# Patient Record
Sex: MALE | Race: WHITE | Employment: FULL TIME | URBAN - METROPOLITAN AREA
[De-identification: names, ages, dates, MRNs, and addresses within clinical notes are randomized per-mention and may not be internally consistent; named-entity substitution may affect disease eponyms.]

---

## 2017-01-17 ENCOUNTER — TELEPHONE (OUTPATIENT)
Dept: INTERNAL MEDICINE CLINIC | Age: 62
End: 2017-01-17

## 2017-01-17 RX ORDER — METHYLPREDNISOLONE 4 MG/1
TABLET ORAL
Qty: 1 KIT | Refills: 0 | Status: SHIPPED | OUTPATIENT
Start: 2017-01-17 | End: 2017-01-23

## 2017-01-17 RX ORDER — PROMETHAZINE HYDROCHLORIDE AND CODEINE PHOSPHATE 6.25; 1 MG/5ML; MG/5ML
5 SYRUP ORAL EVERY 6 HOURS PRN
Qty: 120 ML | Refills: 1 | Status: SHIPPED | OUTPATIENT
Start: 2017-01-17 | End: 2019-03-27 | Stop reason: ALTCHOICE

## 2017-04-06 ENCOUNTER — TELEPHONE (OUTPATIENT)
Dept: INTERNAL MEDICINE CLINIC | Age: 62
End: 2017-04-06

## 2017-04-06 RX ORDER — DICYCLOMINE HYDROCHLORIDE 10 MG/1
10 CAPSULE ORAL EVERY 6 HOURS PRN
Qty: 30 CAPSULE | Refills: 0 | Status: SHIPPED | OUTPATIENT
Start: 2017-04-06 | End: 2019-03-27 | Stop reason: ALTCHOICE

## 2017-07-25 ENCOUNTER — TELEPHONE (OUTPATIENT)
Dept: INTERNAL MEDICINE CLINIC | Age: 62
End: 2017-07-25

## 2017-07-25 DIAGNOSIS — R31.9 HEMATURIA: Primary | ICD-10-CM

## 2017-11-16 RX ORDER — ROSUVASTATIN CALCIUM 20 MG/1
TABLET, COATED ORAL
Qty: 90 TABLET | Refills: 0 | Status: SHIPPED | OUTPATIENT
Start: 2017-11-16 | End: 2018-02-14 | Stop reason: SDUPTHER

## 2017-11-16 RX ORDER — VALSARTAN 80 MG/1
TABLET ORAL
Qty: 90 TABLET | Refills: 0 | Status: SHIPPED | OUTPATIENT
Start: 2017-11-16 | End: 2018-02-14 | Stop reason: SDUPTHER

## 2017-12-23 RX ORDER — AZITHROMYCIN 250 MG/1
TABLET, FILM COATED ORAL
Qty: 1 PACKET | Refills: 0 | Status: SHIPPED | OUTPATIENT
Start: 2017-12-23 | End: 2018-01-02

## 2018-02-14 RX ORDER — VALSARTAN 80 MG/1
TABLET ORAL
Qty: 90 TABLET | Refills: 0 | Status: SHIPPED | OUTPATIENT
Start: 2018-02-14 | End: 2018-06-19 | Stop reason: SDUPTHER

## 2018-02-15 RX ORDER — ROSUVASTATIN CALCIUM 20 MG/1
TABLET, COATED ORAL
Qty: 90 TABLET | Refills: 0 | Status: SHIPPED | OUTPATIENT
Start: 2018-02-15 | End: 2018-06-19 | Stop reason: SDUPTHER

## 2018-05-27 LAB
BASOPHILS ABSOLUTE: 0.04 /ΜL
BASOPHILS RELATIVE PERCENT: 0.6 %
BILIRUBIN, URINE: NEGATIVE
BLOOD, URINE: POSITIVE
BUN BLDV-MCNC: 8 MG/DL
CALCIUM SERPL-MCNC: 9.5 MG/DL
CHLORIDE BLD-SCNC: 104 MMOL/L
CLARITY: CLEAR
CO2: 26 MMOL/L
COLOR: ABNORMAL
CREAT SERPL-MCNC: 0.77 MG/DL
EOSINOPHILS ABSOLUTE: 0.14 /ΜL
EOSINOPHILS RELATIVE PERCENT: 2.2 %
GFR CALCULATED: 99
GLUCOSE BLD-MCNC: 125 MG/DL
GLUCOSE URINE: NEGATIVE
HCT VFR BLD CALC: 41.1 % (ref 41–53)
HEMOGLOBIN: 14.2 G/DL (ref 13.5–17.5)
KETONES, URINE: NEGATIVE
LEUKOCYTE ESTERASE, URINE: NEGATIVE
LYMPHOCYTES ABSOLUTE: 2.63 /ΜL
LYMPHOCYTES RELATIVE PERCENT: 41.4 %
MCH RBC QN AUTO: 31 PG
MCHC RBC AUTO-ENTMCNC: 34.5 G/DL
MCV RBC AUTO: 89.7 FL
MONOCYTES ABSOLUTE: 0.87 /ΜL
MONOCYTES RELATIVE PERCENT: 13.7 %
NEUTROPHILS ABSOLUTE: 2.65 /ΜL
NEUTROPHILS RELATIVE PERCENT: 41.6 %
NITRITE, URINE: NEGATIVE
PDW BLD-RTO: 12.1 %
PH UA: 7 (ref 4.5–8)
PLATELET # BLD: 171 K/ΜL
PMV BLD AUTO: 9.6 FL
POTASSIUM SERPL-SCNC: 4 MMOL/L
PROTEIN UA: POSITIVE
RBC # BLD: 4.58 10^6/ΜL
SODIUM BLD-SCNC: 142 MMOL/L
SPECIFIC GRAVITY, URINE: 1
UROBILINOGEN, URINE: NORMAL
WBC # BLD: 6.36 10^3/ML

## 2018-09-20 RX ORDER — ROSUVASTATIN CALCIUM 20 MG/1
TABLET, COATED ORAL
Qty: 90 TABLET | Refills: 3 | Status: SHIPPED | OUTPATIENT
Start: 2018-09-20 | End: 2019-10-06 | Stop reason: SDUPTHER

## 2019-03-27 ENCOUNTER — OFFICE VISIT (OUTPATIENT)
Dept: INTERNAL MEDICINE CLINIC | Age: 64
End: 2019-03-27
Payer: COMMERCIAL

## 2019-03-27 DIAGNOSIS — Z23 NEED FOR PROPHYLACTIC VACCINATION AND INOCULATION AGAINST VARICELLA: ICD-10-CM

## 2019-03-27 DIAGNOSIS — E55.9 VITAMIN D DEFICIENCY: ICD-10-CM

## 2019-03-27 DIAGNOSIS — Z00.00 WELLNESS EXAMINATION: Primary | ICD-10-CM

## 2019-03-27 DIAGNOSIS — R00.2 PALPITATIONS: ICD-10-CM

## 2019-03-27 PROCEDURE — 93000 ELECTROCARDIOGRAM COMPLETE: CPT | Performed by: INTERNAL MEDICINE

## 2019-03-27 PROCEDURE — 99396 PREV VISIT EST AGE 40-64: CPT | Performed by: INTERNAL MEDICINE

## 2019-03-27 ASSESSMENT — PATIENT HEALTH QUESTIONNAIRE - PHQ9
1. LITTLE INTEREST OR PLEASURE IN DOING THINGS: 0
SUM OF ALL RESPONSES TO PHQ QUESTIONS 1-9: 0
2. FEELING DOWN, DEPRESSED OR HOPELESS: 0
SUM OF ALL RESPONSES TO PHQ9 QUESTIONS 1 & 2: 0
SUM OF ALL RESPONSES TO PHQ QUESTIONS 1-9: 0

## 2019-04-07 VITALS
HEIGHT: 76 IN | HEART RATE: 72 BPM | DIASTOLIC BLOOD PRESSURE: 70 MMHG | SYSTOLIC BLOOD PRESSURE: 130 MMHG | WEIGHT: 210 LBS | BODY MASS INDEX: 25.57 KG/M2 | RESPIRATION RATE: 16 BRPM

## 2019-04-07 NOTE — PROGRESS NOTES
Annual  Exam      PI:        The patient is a 66year old white male who present for his wellness exam. He has a history of atrial fibrillation status post cardioversion then ablation therapy at [facility] on 3/8/2012. He has been doing well since then. Doing well and using his medications as directed and without side effects. No headache, chest pain, or breathing difficulties. Doing his normal physical activities without limitations or symptoms. There is a history of HTN, hyperlipidemia, and GERD presently on medication with good results. No headache, chest pain, or breathing difficulties. No swallowing difficulties. He had a normal abdominal aortic ultrasound by Dr Josefa Iglesias on 10/28/2008. He completed his colonoscopy on 11/19/2009 by Dr Kash Jaramillo. A few scattered diverticuli were seen otherwise normal to the cecum. A ten year follow up was recommended. No abdominal pain, change in bowel habits, hematochezia, or melanotic stools. He was using Nexium for GERD symptoms with no swallowing difficulties. He is no longer on medication. He has been diagnosed with bladder cancer and underwent urorobotic surgery at [facility] by Dr Tonya Herrera. He is now undergoing bladder infusion with BCG solution. PMH:    Problems of bladder cancer, HTN, hyperlipidemia, GERD, glaucoma, and atrial fibrillation. No history of ASHD, diabetes, or pulmonary disorder. Surgical history is none. MED:   Current Outpatient Medications   Medication Sig Dispense Refill    valsartan (DIOVAN) 80 MG tablet TAKE 1 TABLET BY MOUTH EVERY DAY 90 tablet 3    rosuvastatin (CRESTOR) 20 MG tablet TAKE 1 TABLET BY MOUTH EVERY DAY 90 tablet 3    fluticasone (FLONASE) 50 MCG/ACT nasal spray USE 1 SPRAY IN EACH NOSTRIL EVERY DAY 1 Bottle 1    latanoprost (XALATAN) 0.005 % ophthalmic solution Place 1 drop into both eyes.  Dr Lakhani Raymond  3    esomeprazole (NEXIUM) 20 MG capsule Take 20 mg by mouth every morning (before breakfast). OTC      EPINEPHrine (EPIPEN 2-BRUNA) 0.3 MG/0.3ML KIERAN injection Inject 0.3 mLs into the muscle once as needed for 1 dose. 1 Device 1     No current facility-administered medications for this visit. ALL: allergy to all nuts, intolerance to Zocor. SH:  Quit smoking cigarettes at age 34 with a 4 pack year history. Has 4 beers a week and two coffees a day. FH:  Mother  age 80 of CVA with history of ASHD. Father  age 62 of MI and was a heavy smoker. ROS: Head:       No headache, voice change, hoarseness, or swallowing difficulties            Resp:       No wheezing, coughing, or hemoptysis            CV:           No rest or exertional chest pain. No orthopnea or PND. GI:            No abdominal pain, change in bowel habits, hematochezia, or melanotic stool. :          No frequency, urgency, dysuria, or hematuria. Neuro:     No symptoms of cranial nerve dysfunction, gait difficulties, or extremity weakness. Immun:    Up to date on influenza and TDap. PE:    Vitals:      Blood pressure 130/70, pulse 72, resp. rate 16, height 6' 3.5\" (1.918 m), weight 210 lb (95.3 kg). GEN:       No acute distress, alert and oriented x3            HENNT:  TMs and auditory canals are clear. Pupils equal and reactive to light. EOMs intact. Fundi are clear and discs are flat. Oropharynx is clear. No facial rash or lesions. NECK:     Supple without palpable lymph nodes. Thyroid exam is normal.            LUNGS:   Clear to auscultation. CV:          Regular pulse. Normal S1 & S2. No murmur. No carotid bruits. Axilla:      No palpable nodes. ABD:       Bowel sounds normal. There is no distention. No abdominal bruits.                             No tenderness, guarding, rebound, masses, or organomegaly. NAHEED:      No hernia or palpable lymphadenopathy. RECT:    Deferred             EXT:       No edema. Distal pulses are normal.            SKIN:      No abnormal skin lesions were seen. NEURO: Cranial nerves II-XII grossly intact. Gait was normal. Moving all extremities well                          IMP: 1. Wellness exam              PLAN:  1.  Comp Chem, CBC, Lipid, TSH, UA, PSA, CPK, Vitamin D, and EKG              2. Hgba1c              3. Return in two weeks

## 2019-04-09 ENCOUNTER — OFFICE VISIT (OUTPATIENT)
Dept: INTERNAL MEDICINE CLINIC | Age: 64
End: 2019-04-09
Payer: COMMERCIAL

## 2019-04-09 VITALS — HEART RATE: 74 BPM | DIASTOLIC BLOOD PRESSURE: 70 MMHG | SYSTOLIC BLOOD PRESSURE: 136 MMHG | RESPIRATION RATE: 15 BRPM

## 2019-04-09 DIAGNOSIS — I10 ESSENTIAL HYPERTENSION: Primary | ICD-10-CM

## 2019-04-09 DIAGNOSIS — E55.9 VITAMIN D DEFICIENCY: ICD-10-CM

## 2019-04-09 DIAGNOSIS — E78.5 HYPERLIPIDEMIA, UNSPECIFIED HYPERLIPIDEMIA TYPE: ICD-10-CM

## 2019-04-09 DIAGNOSIS — I48.0 PAROXYSMAL ATRIAL FIBRILLATION (HCC): ICD-10-CM

## 2019-04-09 DIAGNOSIS — E78.2 MIXED HYPERLIPIDEMIA: ICD-10-CM

## 2019-04-09 DIAGNOSIS — E74.39 GLUCOSE INTOLERANCE: ICD-10-CM

## 2019-04-09 PROCEDURE — 99213 OFFICE O/P EST LOW 20 MIN: CPT | Performed by: INTERNAL MEDICINE

## 2019-04-19 PROBLEM — E55.9 VITAMIN D DEFICIENCY: Status: ACTIVE | Noted: 2019-04-19

## 2019-04-19 PROBLEM — E74.39 GLUCOSE INTOLERANCE: Status: ACTIVE | Noted: 2019-04-19

## 2019-04-19 PROBLEM — C67.4 MALIGNANT NEOPLASM OF POSTERIOR WALL OF URINARY BLADDER (HCC): Status: ACTIVE | Noted: 2018-08-06

## 2019-04-20 NOTE — PROGRESS NOTES
S: Patient presents with problems of atrial fib s/p ablation, HTN, hyperlipidemia, GERD, and bladder cancer. Presently doing well with no headache, chest pain, breathing difficulties. He is undergoing bladder infusion with BCG at [facility]. He is tolerating his Crestor and following his low fat low carb diet. O:Blood pressure 136/70, pulse 74, resp. rate 15.       Lungs: clear      Cardio: reg pulse      Ext: no edema        Labs: from 3/27/19 CBC & UA normal, LDL 90 HDL 45 Trig 107, Hgba1c 6.1%, Vit D 24, PSA 2.89, TSH 1.99, CK 79, Lytes normal, BUN 16 Creat 0.8, Glucose 106, Liver normal except ALT 53       EKG: NSR, incomplete RBBB    A: Bladder Cancer treated at [facility]      Hyperlipidemia on Crestor      Atrial fib s/p ablation therapy      Glucose intolerance      Vitamin D deficiency      ALT elevation      HTN controlled    P: copy of labs and EKG given      Continue present medications      Vitamin D3 at 2,000 units a day       OV in 6M with basic chem lipid liver cpk cbc       H&P one year

## 2019-10-22 DIAGNOSIS — I10 ESSENTIAL HYPERTENSION: ICD-10-CM

## 2019-10-22 DIAGNOSIS — I48.0 PAROXYSMAL ATRIAL FIBRILLATION (HCC): ICD-10-CM

## 2019-10-22 DIAGNOSIS — E78.5 HYPERLIPIDEMIA, UNSPECIFIED HYPERLIPIDEMIA TYPE: ICD-10-CM

## 2019-10-22 LAB
ALBUMIN SERPL-MCNC: 4.7 G/DL (ref 3.4–5)
ALP BLD-CCNC: 84 U/L (ref 40–129)
ALT SERPL-CCNC: 53 U/L (ref 10–40)
ANION GAP SERPL CALCULATED.3IONS-SCNC: 13 MMOL/L (ref 3–16)
AST SERPL-CCNC: 33 U/L (ref 15–37)
BASOPHILS ABSOLUTE: 0 K/UL (ref 0–0.2)
BASOPHILS RELATIVE PERCENT: 0.6 %
BILIRUB SERPL-MCNC: 0.3 MG/DL (ref 0–1)
BILIRUBIN DIRECT: <0.2 MG/DL (ref 0–0.3)
BILIRUBIN, INDIRECT: ABNORMAL MG/DL (ref 0–1)
BUN BLDV-MCNC: 15 MG/DL (ref 7–20)
CALCIUM SERPL-MCNC: 9.4 MG/DL (ref 8.3–10.6)
CHLORIDE BLD-SCNC: 105 MMOL/L (ref 99–110)
CHOLESTEROL, TOTAL: 148 MG/DL (ref 0–199)
CO2: 25 MMOL/L (ref 21–32)
CREAT SERPL-MCNC: 0.8 MG/DL (ref 0.8–1.3)
EOSINOPHILS ABSOLUTE: 0.2 K/UL (ref 0–0.6)
EOSINOPHILS RELATIVE PERCENT: 3.3 %
GFR AFRICAN AMERICAN: >60
GFR NON-AFRICAN AMERICAN: >60
GLUCOSE BLD-MCNC: 119 MG/DL (ref 70–99)
HCT VFR BLD CALC: 43.1 % (ref 40.5–52.5)
HDLC SERPL-MCNC: 45 MG/DL (ref 40–60)
HEMOGLOBIN: 14.5 G/DL (ref 13.5–17.5)
LDL CHOLESTEROL CALCULATED: 86 MG/DL
LYMPHOCYTES ABSOLUTE: 1.7 K/UL (ref 1–5.1)
LYMPHOCYTES RELATIVE PERCENT: 27.7 %
MCH RBC QN AUTO: 31.3 PG (ref 26–34)
MCHC RBC AUTO-ENTMCNC: 33.7 G/DL (ref 31–36)
MCV RBC AUTO: 92.6 FL (ref 80–100)
MONOCYTES ABSOLUTE: 0.7 K/UL (ref 0–1.3)
MONOCYTES RELATIVE PERCENT: 11.8 %
NEUTROPHILS ABSOLUTE: 3.4 K/UL (ref 1.7–7.7)
NEUTROPHILS RELATIVE PERCENT: 56.6 %
PDW BLD-RTO: 12.9 % (ref 12.4–15.4)
PLATELET # BLD: 165 K/UL (ref 135–450)
PMV BLD AUTO: 8.6 FL (ref 5–10.5)
POTASSIUM SERPL-SCNC: 4.6 MMOL/L (ref 3.5–5.1)
RBC # BLD: 4.65 M/UL (ref 4.2–5.9)
SODIUM BLD-SCNC: 143 MMOL/L (ref 136–145)
TOTAL CK: 75 U/L (ref 39–308)
TOTAL PROTEIN: 7 G/DL (ref 6.4–8.2)
TRIGL SERPL-MCNC: 87 MG/DL (ref 0–150)
VLDLC SERPL CALC-MCNC: 17 MG/DL
WBC # BLD: 6 K/UL (ref 4–11)

## 2019-10-25 ENCOUNTER — OFFICE VISIT (OUTPATIENT)
Dept: INTERNAL MEDICINE CLINIC | Age: 64
End: 2019-10-25
Payer: COMMERCIAL

## 2019-10-25 VITALS
DIASTOLIC BLOOD PRESSURE: 70 MMHG | SYSTOLIC BLOOD PRESSURE: 130 MMHG | HEART RATE: 72 BPM | BODY MASS INDEX: 25.53 KG/M2 | WEIGHT: 207 LBS

## 2019-10-25 DIAGNOSIS — E78.5 HYPERLIPIDEMIA, UNSPECIFIED HYPERLIPIDEMIA TYPE: ICD-10-CM

## 2019-10-25 DIAGNOSIS — R73.09 ELEVATED HEMOGLOBIN A1C: ICD-10-CM

## 2019-10-25 DIAGNOSIS — R74.01 ELEVATED ALT MEASUREMENT: ICD-10-CM

## 2019-10-25 DIAGNOSIS — G47.33 OSA (OBSTRUCTIVE SLEEP APNEA): ICD-10-CM

## 2019-10-25 DIAGNOSIS — I10 ESSENTIAL HYPERTENSION: Primary | ICD-10-CM

## 2019-10-25 LAB
ALBUMIN SERPL-MCNC: 5.5 G/DL (ref 3.4–5)
ALP BLD-CCNC: 85 U/L (ref 40–129)
ALT SERPL-CCNC: 53 U/L (ref 10–40)
AST SERPL-CCNC: 32 U/L (ref 15–37)
BILIRUB SERPL-MCNC: 0.4 MG/DL (ref 0–1)
BILIRUBIN DIRECT: <0.2 MG/DL (ref 0–0.3)
BILIRUBIN URINE: NEGATIVE
BILIRUBIN, INDIRECT: ABNORMAL MG/DL (ref 0–1)
BLOOD, URINE: NEGATIVE
CLARITY: CLEAR
COLOR: YELLOW
EPITHELIAL CELLS, UA: 0 /HPF (ref 0–5)
GLUCOSE URINE: NEGATIVE MG/DL
HYALINE CASTS: 0 /LPF (ref 0–8)
KETONES, URINE: NEGATIVE MG/DL
LEUKOCYTE ESTERASE, URINE: NEGATIVE
MICROSCOPIC EXAMINATION: NORMAL
NITRITE, URINE: NEGATIVE
PH UA: 6 (ref 5–8)
PROTEIN UA: NEGATIVE MG/DL
RBC UA: 0 /HPF (ref 0–4)
SPECIFIC GRAVITY UA: 1.01 (ref 1–1.03)
TOTAL PROTEIN: 7.4 G/DL (ref 6.4–8.2)
URINE TYPE: NORMAL
UROBILINOGEN, URINE: 0.2 E.U./DL
WBC UA: 1 /HPF (ref 0–5)

## 2019-10-25 PROCEDURE — 36415 COLL VENOUS BLD VENIPUNCTURE: CPT | Performed by: INTERNAL MEDICINE

## 2019-10-25 PROCEDURE — 99213 OFFICE O/P EST LOW 20 MIN: CPT | Performed by: INTERNAL MEDICINE

## 2019-10-26 LAB
ESTIMATED AVERAGE GLUCOSE: 116.9 MG/DL
HBA1C MFR BLD: 5.7 %

## 2019-11-07 DIAGNOSIS — R74.01 ELEVATED ALT MEASUREMENT: Primary | ICD-10-CM

## 2019-11-07 DIAGNOSIS — K76.0 FATTY LIVER: ICD-10-CM

## 2019-11-14 ENCOUNTER — HOSPITAL ENCOUNTER (OUTPATIENT)
Dept: SLEEP CENTER | Age: 64
Discharge: HOME | End: 2019-11-14
Payer: COMMERCIAL

## 2019-11-14 VITALS
OXYGEN SATURATION: 96 % | WEIGHT: 208.3 LBS | SYSTOLIC BLOOD PRESSURE: 136 MMHG | BODY MASS INDEX: 25.9 KG/M2 | HEART RATE: 100 BPM | HEIGHT: 75 IN | DIASTOLIC BLOOD PRESSURE: 75 MMHG

## 2019-11-14 DIAGNOSIS — Z87.891 EX-SMOKER: ICD-10-CM

## 2019-11-14 DIAGNOSIS — G47.33 OSA ON CPAP: ICD-10-CM

## 2019-11-14 DIAGNOSIS — Z99.89 OSA ON CPAP: ICD-10-CM

## 2019-11-14 PROCEDURE — 99211 OFF/OP EST MAY X REQ PHY/QHP: CPT | Performed by: INTERNAL MEDICINE

## 2019-11-14 PROCEDURE — 99204 OFFICE O/P NEW MOD 45 MIN: CPT | Performed by: INTERNAL MEDICINE

## 2019-11-14 ASSESSMENT — SLEEP AND FATIGUE QUESTIONNAIRES
HOW LIKELY ARE YOU TO NOD OFF OR FALL ASLEEP WHILE LYING DOWN TO REST IN THE AFTERNOON WHEN CIRCUMSTANCES PERMIT: 0
HOW LIKELY ARE YOU TO NOD OFF OR FALL ASLEEP WHILE SITTING INACTIVE IN A PUBLIC PLACE: 0
HOW LIKELY ARE YOU TO NOD OFF OR FALL ASLEEP WHILE SITTING QUIETLY AFTER LUNCH WITHOUT ALCOHOL: 0
HOW LIKELY ARE YOU TO NOD OFF OR FALL ASLEEP WHILE WATCHING TV: 1
HOW LIKELY ARE YOU TO NOD OFF OR FALL ASLEEP WHILE SITTING AND TALKING TO SOMEONE: 0
HOW LIKELY ARE YOU TO NOD OFF OR FALL ASLEEP IN A CAR, WHILE STOPPED FOR A FEW MINUTES IN TRAFFIC: 0
HOW LIKELY ARE YOU TO NOD OFF OR FALL ASLEEP WHILE SITTING AND READING: 1
HOW LIKELY ARE YOU TO NOD OFF OR FALL ASLEEP WHEN YOU ARE A PASSENGER IN A CAR FOR AN HOUR WITHOUT A BREAK: 1
ESS TOTAL SCORE: 3

## 2019-11-22 ENCOUNTER — HOSPITAL ENCOUNTER (OUTPATIENT)
Dept: ULTRASOUND IMAGING | Age: 64
Discharge: HOME | End: 2019-11-22
Payer: COMMERCIAL

## 2019-11-22 DIAGNOSIS — K76.0 FATTY LIVER: ICD-10-CM

## 2019-11-22 DIAGNOSIS — R74.01 ELEVATED ALT MEASUREMENT: ICD-10-CM

## 2019-11-22 PROCEDURE — 76705 ECHO EXAM OF ABDOMEN: CPT

## 2019-12-05 ENCOUNTER — TELEPHONE (OUTPATIENT)
Dept: PULMONOLOGY | Age: 64
End: 2019-12-05

## 2019-12-05 ENCOUNTER — HOSPITAL ENCOUNTER (OUTPATIENT)
Dept: SLEEP CENTER | Age: 64
Discharge: HOME | End: 2019-12-05
Payer: COMMERCIAL

## 2019-12-05 PROCEDURE — 95806 SLEEP STUDY UNATT&RESP EFFT: CPT | Performed by: INTERNAL MEDICINE

## 2019-12-05 PROCEDURE — G0398 HOME SLEEP TEST/TYPE 2 PORTA: HCPCS | Performed by: INTERNAL MEDICINE

## 2019-12-18 ENCOUNTER — TELEPHONE (OUTPATIENT)
Dept: PULMONOLOGY | Age: 64
End: 2019-12-18

## 2020-01-14 ENCOUNTER — TELEPHONE (OUTPATIENT)
Dept: PULMONOLOGY | Age: 65
End: 2020-01-14

## 2020-02-11 ENCOUNTER — OFFICE VISIT (OUTPATIENT)
Dept: PULMONOLOGY | Age: 65
End: 2020-02-11
Payer: COMMERCIAL

## 2020-02-11 VITALS
HEIGHT: 76 IN | BODY MASS INDEX: 24.91 KG/M2 | HEART RATE: 87 BPM | WEIGHT: 204.6 LBS | DIASTOLIC BLOOD PRESSURE: 84 MMHG | SYSTOLIC BLOOD PRESSURE: 138 MMHG | OXYGEN SATURATION: 98 %

## 2020-02-11 PROBLEM — G47.10 HYPERSOMNIA: Status: ACTIVE | Noted: 2020-02-11

## 2020-02-11 PROCEDURE — 99243 OFF/OP CNSLTJ NEW/EST LOW 30: CPT | Performed by: INTERNAL MEDICINE

## 2020-02-11 ASSESSMENT — ENCOUNTER SYMPTOMS
EYE DISCHARGE: 0
BACK PAIN: 0
SHORTNESS OF BREATH: 0
EYE ITCHING: 0
ABDOMINAL PAIN: 0
ABDOMINAL DISTENTION: 0
COUGH: 0

## 2020-02-11 NOTE — PROGRESS NOTES
allergies and food allergies. Neurological: Negative for light-headedness and numbness. Hematological: Negative for adenopathy. Psychiatric/Behavioral: Negative for agitation and behavioral problems. Objective:   /84   Pulse 87   Ht 6' 3.5\" (1.918 m)   Wt 204 lb 9.6 oz (92.8 kg)   SpO2 98%   BMI 25.24 kg/m²   Body mass index is 25.24 kg/m². Sleep Medicine 11/14/2019   Sitting and reading 1   Watching TV 1   Sitting, inactive in a public place (e.g. a theatre or a meeting) 0   As a passenger in a car for an hour without a break 1   Lying down to rest in the afternoon when circumstances permit 0   Sitting and talking to someone 0   Sitting quietly after a lunch without alcohol 0   In a car, while stopped for a few minutes in traffic 0   Total score 3   Neck circumference 16.5     {MALLAMPATI:3    Physical Exam  Vitals signs reviewed. Constitutional:       Appearance: Normal appearance. HENT:      Head: Normocephalic and atraumatic. Nose: Nose normal.      Mouth/Throat:      Mouth: Mucous membranes are dry. Eyes:      Extraocular Movements: Extraocular movements intact. Pupils: Pupils are equal, round, and reactive to light. Neck:      Musculoskeletal: Normal range of motion and neck supple. Cardiovascular:      Rate and Rhythm: Normal rate and regular rhythm. Pulses: Normal pulses. Heart sounds: Normal heart sounds. Pulmonary:      Effort: Pulmonary effort is normal.      Breath sounds: Normal breath sounds. Abdominal:      General: Abdomen is flat. Palpations: Abdomen is soft. Musculoskeletal: Normal range of motion. Skin:     General: Skin is warm and dry. Neurological:      General: No focal deficit present. Mental Status: He is alert and oriented to person, place, and time.    Psychiatric:         Mood and Affect: Mood normal.         Behavior: Behavior normal.         Radiology: None    Assessment and Plan     Problem List        Pulmonary Problems    MERCEDES (obstructive sleep apnea)     Advised to be compliant with the CPAP  Loose weight            Other    Ex-smoker     Advised to c/w quitting smoking         Hypersomnia     Advised to be compliant with the CPAP  Loose weight                    Return in about 1 year (around 2/11/2021) for 2 week download data.      Progress notes sent to the referring Provider    Agusto Voss MD  2/11/2020  9:49 AM

## 2020-04-15 RX ORDER — ROSUVASTATIN CALCIUM 20 MG/1
TABLET, COATED ORAL
Qty: 90 TABLET | Refills: 3 | Status: SHIPPED | OUTPATIENT
Start: 2020-04-15

## 2020-04-15 RX ORDER — VALSARTAN 80 MG/1
TABLET ORAL
Qty: 90 TABLET | Refills: 3 | Status: SHIPPED | OUTPATIENT
Start: 2020-04-15

## 2020-09-28 ENCOUNTER — TELEPHONE (OUTPATIENT)
Dept: INTERNAL MEDICINE CLINIC | Age: 65
End: 2020-09-28

## 2020-09-28 NOTE — TELEPHONE ENCOUNTER
Pt has to take eye drops and needs an approval since it may affect other medications, this is medication is timolol

## 2020-09-30 NOTE — TELEPHONE ENCOUNTER
Patient called back wants clearance to take any eyedrop from the [facility] as it could cause heart problems. Explained to patient the office would need to make an appointment to evualate before approving that. Patient said. \"ma'am I haven't been able to get in to since th covid. \" I  tied to get him in for an appointment. He just said \"I'll tell you what just have a nice day. \" and hung up the phone.

## 2020-12-11 ENCOUNTER — APPOINTMENT (OUTPATIENT)
Dept: CT IMAGING | Age: 65
End: 2020-12-11
Payer: COMMERCIAL

## 2020-12-11 ENCOUNTER — HOSPITAL ENCOUNTER (OUTPATIENT)
Age: 65
Setting detail: OBSERVATION
Discharge: HOME | End: 2020-12-12
Attending: EMERGENCY MEDICINE | Admitting: HOSPITALIST
Payer: COMMERCIAL

## 2020-12-11 ENCOUNTER — APPOINTMENT (OUTPATIENT)
Dept: GENERAL RADIOLOGY | Age: 65
End: 2020-12-11
Payer: COMMERCIAL

## 2020-12-11 PROBLEM — G45.9 TIA (TRANSIENT ISCHEMIC ATTACK): Status: ACTIVE | Noted: 2020-12-11

## 2020-12-11 LAB
ALBUMIN SERPL-MCNC: 4 GM/DL (ref 3.4–5)
ALP BLD-CCNC: 74 IU/L (ref 40–129)
ALT SERPL-CCNC: 64 U/L (ref 10–40)
ANION GAP SERPL CALCULATED.3IONS-SCNC: 11 MMOL/L (ref 4–16)
AST SERPL-CCNC: 28 IU/L (ref 15–37)
BASOPHILS ABSOLUTE: 0 K/CU MM
BASOPHILS RELATIVE PERCENT: 0.5 % (ref 0–1)
BILIRUB SERPL-MCNC: 0.4 MG/DL (ref 0–1)
BUN BLDV-MCNC: 11 MG/DL (ref 6–23)
CALCIUM SERPL-MCNC: 8.7 MG/DL (ref 8.3–10.6)
CHLORIDE BLD-SCNC: 94 MMOL/L (ref 99–110)
CHP ED QC CHECK: YES
CO2: 25 MMOL/L (ref 21–32)
CREAT SERPL-MCNC: 0.8 MG/DL (ref 0.9–1.3)
DIFFERENTIAL TYPE: ABNORMAL
EKG ATRIAL RATE: 91 BPM
EKG DIAGNOSIS: NORMAL
EKG P AXIS: 42 DEGREES
EKG P-R INTERVAL: 162 MS
EKG Q-T INTERVAL: 380 MS
EKG QRS DURATION: 128 MS
EKG QTC CALCULATION (BAZETT): 467 MS
EKG R AXIS: 23 DEGREES
EKG T AXIS: 33 DEGREES
EKG VENTRICULAR RATE: 91 BPM
EOSINOPHILS ABSOLUTE: 0.1 K/CU MM
EOSINOPHILS RELATIVE PERCENT: 1.9 % (ref 0–3)
GFR AFRICAN AMERICAN: >60 ML/MIN/1.73M2
GFR NON-AFRICAN AMERICAN: >60 ML/MIN/1.73M2
GLUCOSE BLD-MCNC: 116 MG/DL (ref 70–99)
GLUCOSE BLD-MCNC: 120 MG/DL
GLUCOSE BLD-MCNC: 120 MG/DL (ref 70–99)
HCT VFR BLD CALC: 42.4 % (ref 42–52)
HEMOGLOBIN: 14.7 GM/DL (ref 13.5–18)
IMMATURE NEUTROPHIL %: 0.5 % (ref 0–0.43)
INR BLD: 0.96 INDEX
LYMPHOCYTES ABSOLUTE: 1.5 K/CU MM
LYMPHOCYTES RELATIVE PERCENT: 25.7 % (ref 24–44)
MAGNESIUM: 1.8 MG/DL (ref 1.8–2.4)
MCH RBC QN AUTO: 31.7 PG (ref 27–31)
MCHC RBC AUTO-ENTMCNC: 34.7 % (ref 32–36)
MCV RBC AUTO: 91.4 FL (ref 78–100)
MONOCYTES ABSOLUTE: 0.7 K/CU MM
MONOCYTES RELATIVE PERCENT: 11.8 % (ref 0–4)
NUCLEATED RBC %: 0 %
PDW BLD-RTO: 11.9 % (ref 11.7–14.9)
PLATELET # BLD: 157 K/CU MM (ref 140–440)
PMV BLD AUTO: 9.4 FL (ref 7.5–11.1)
POTASSIUM SERPL-SCNC: 3.7 MMOL/L (ref 3.5–5.1)
PROTHROMBIN TIME: 11.6 SECONDS (ref 11.7–14.5)
RBC # BLD: 4.64 M/CU MM (ref 4.6–6.2)
SEGMENTED NEUTROPHILS ABSOLUTE COUNT: 3.4 K/CU MM
SEGMENTED NEUTROPHILS RELATIVE PERCENT: 59.6 % (ref 36–66)
SODIUM BLD-SCNC: 130 MMOL/L (ref 135–145)
TOTAL IMMATURE NEUTOROPHIL: 0.03 K/CU MM
TOTAL NUCLEATED RBC: 0 K/CU MM
TOTAL PROTEIN: 6.7 GM/DL (ref 6.4–8.2)
TROPONIN T: <0.01 NG/ML
WBC # BLD: 5.8 K/CU MM (ref 4–10.5)

## 2020-12-11 PROCEDURE — G0378 HOSPITAL OBSERVATION PER HR: HCPCS

## 2020-12-11 PROCEDURE — 6370000000 HC RX 637 (ALT 250 FOR IP): Performed by: NURSE PRACTITIONER

## 2020-12-11 PROCEDURE — 70496 CT ANGIOGRAPHY HEAD: CPT

## 2020-12-11 PROCEDURE — 83735 ASSAY OF MAGNESIUM: CPT

## 2020-12-11 PROCEDURE — 6360000002 HC RX W HCPCS: Performed by: NURSE PRACTITIONER

## 2020-12-11 PROCEDURE — 36415 COLL VENOUS BLD VENIPUNCTURE: CPT

## 2020-12-11 PROCEDURE — 82962 GLUCOSE BLOOD TEST: CPT

## 2020-12-11 PROCEDURE — 70450 CT HEAD/BRAIN W/O DYE: CPT

## 2020-12-11 PROCEDURE — 80053 COMPREHEN METABOLIC PANEL: CPT

## 2020-12-11 PROCEDURE — 85025 COMPLETE CBC W/AUTO DIFF WBC: CPT

## 2020-12-11 PROCEDURE — 85610 PROTHROMBIN TIME: CPT

## 2020-12-11 PROCEDURE — 93005 ELECTROCARDIOGRAM TRACING: CPT | Performed by: PHYSICIAN ASSISTANT

## 2020-12-11 PROCEDURE — 6360000004 HC RX CONTRAST MEDICATION: Performed by: PHYSICIAN ASSISTANT

## 2020-12-11 PROCEDURE — 71045 X-RAY EXAM CHEST 1 VIEW: CPT

## 2020-12-11 PROCEDURE — 2580000003 HC RX 258: Performed by: NURSE PRACTITIONER

## 2020-12-11 PROCEDURE — 99285 EMERGENCY DEPT VISIT HI MDM: CPT

## 2020-12-11 PROCEDURE — 93010 ELECTROCARDIOGRAM REPORT: CPT | Performed by: INTERNAL MEDICINE

## 2020-12-11 PROCEDURE — 96372 THER/PROPH/DIAG INJ SC/IM: CPT

## 2020-12-11 PROCEDURE — 84484 ASSAY OF TROPONIN QUANT: CPT

## 2020-12-11 RX ORDER — LABETALOL HYDROCHLORIDE 5 MG/ML
10 INJECTION, SOLUTION INTRAVENOUS EVERY 10 MIN PRN
Status: DISCONTINUED | OUTPATIENT
Start: 2020-12-11 | End: 2020-12-12 | Stop reason: HOSPADM

## 2020-12-11 RX ORDER — ASPIRIN 300 MG/1
300 SUPPOSITORY RECTAL DAILY
Status: DISCONTINUED | OUTPATIENT
Start: 2020-12-11 | End: 2020-12-12 | Stop reason: HOSPADM

## 2020-12-11 RX ORDER — PANTOPRAZOLE SODIUM 40 MG/1
40 TABLET, DELAYED RELEASE ORAL
Status: DISCONTINUED | OUTPATIENT
Start: 2020-12-12 | End: 2020-12-12 | Stop reason: HOSPADM

## 2020-12-11 RX ORDER — POLYETHYLENE GLYCOL 3350 17 G/17G
17 POWDER, FOR SOLUTION ORAL DAILY PRN
Status: DISCONTINUED | OUTPATIENT
Start: 2020-12-11 | End: 2020-12-12 | Stop reason: HOSPADM

## 2020-12-11 RX ORDER — SODIUM CHLORIDE 0.9 % (FLUSH) 0.9 %
10 SYRINGE (ML) INJECTION PRN
Status: DISCONTINUED | OUTPATIENT
Start: 2020-12-11 | End: 2020-12-12 | Stop reason: HOSPADM

## 2020-12-11 RX ORDER — ROSUVASTATIN CALCIUM 20 MG/1
20 TABLET, COATED ORAL DAILY
Status: DISCONTINUED | OUTPATIENT
Start: 2020-12-11 | End: 2020-12-12 | Stop reason: HOSPADM

## 2020-12-11 RX ORDER — SODIUM CHLORIDE 0.9 % (FLUSH) 0.9 %
10 SYRINGE (ML) INJECTION EVERY 12 HOURS SCHEDULED
Status: DISCONTINUED | OUTPATIENT
Start: 2020-12-11 | End: 2020-12-12 | Stop reason: HOSPADM

## 2020-12-11 RX ORDER — ASPIRIN 81 MG/1
81 TABLET ORAL DAILY
Status: DISCONTINUED | OUTPATIENT
Start: 2020-12-11 | End: 2020-12-12 | Stop reason: HOSPADM

## 2020-12-11 RX ORDER — ONDANSETRON 2 MG/ML
4 INJECTION INTRAMUSCULAR; INTRAVENOUS EVERY 6 HOURS PRN
Status: DISCONTINUED | OUTPATIENT
Start: 2020-12-11 | End: 2020-12-12 | Stop reason: HOSPADM

## 2020-12-11 RX ORDER — LATANOPROST 50 UG/ML
1 SOLUTION/ DROPS OPHTHALMIC NIGHTLY
Status: DISCONTINUED | OUTPATIENT
Start: 2020-12-11 | End: 2020-12-12 | Stop reason: HOSPADM

## 2020-12-11 RX ORDER — ROSUVASTATIN CALCIUM 20 MG/1
20 TABLET, COATED ORAL DAILY
Status: DISCONTINUED | OUTPATIENT
Start: 2020-12-11 | End: 2020-12-11 | Stop reason: CLARIF

## 2020-12-11 RX ORDER — BIMATOPROST 0.01 %
1 DROPS OPHTHALMIC (EYE) NIGHTLY
COMMUNITY
Start: 2020-12-07

## 2020-12-11 RX ORDER — PROMETHAZINE HYDROCHLORIDE 25 MG/1
12.5 TABLET ORAL EVERY 6 HOURS PRN
Status: DISCONTINUED | OUTPATIENT
Start: 2020-12-11 | End: 2020-12-12 | Stop reason: HOSPADM

## 2020-12-11 RX ORDER — SODIUM CHLORIDE 9 MG/ML
INJECTION, SOLUTION INTRAVENOUS CONTINUOUS
Status: DISCONTINUED | OUTPATIENT
Start: 2020-12-11 | End: 2020-12-12 | Stop reason: HOSPADM

## 2020-12-11 RX ADMIN — IOPAMIDOL 80 ML: 755 INJECTION, SOLUTION INTRAVENOUS at 14:11

## 2020-12-11 RX ADMIN — ENOXAPARIN SODIUM 40 MG: 100 INJECTION SUBCUTANEOUS at 20:19

## 2020-12-11 RX ADMIN — SODIUM CHLORIDE: 9 INJECTION, SOLUTION INTRAVENOUS at 19:44

## 2020-12-11 RX ADMIN — ASPIRIN 81 MG: 81 TABLET, COATED ORAL at 20:19

## 2020-12-11 RX ADMIN — SODIUM CHLORIDE, PRESERVATIVE FREE 10 ML: 5 INJECTION INTRAVENOUS at 20:20

## 2020-12-11 RX ADMIN — LATANOPROST 1 DROP: 50 SOLUTION OPHTHALMIC at 20:20

## 2020-12-11 NOTE — ED PROVIDER NOTES
EMERGENCY DEPARTMENT ENCOUNTER      PCP: Jeanie Deluna MD    CHIEF COMPLAINT    Chief Complaint   Patient presents with    Loss of Vision     in right eye         Of note, this patient was also evaluated by the attending physician, Dr. Summer Mayo      HPI    Kalpesh Abarca is a 60 y.o. male who presents to the emergency department today via private vehicle with transient peripheral vision loss. Patient states that around  today he came downstairs and looked at his computer screen and could only see the left side of the screen. He states that he then went downstairs to look at his wife and could only see \"the left side of her face\". He states that then completely resolved, he states that he told one of his daughters about it who then took him into the emergency department. While talking to the patient he states that it returned. It is unclear whether this by ocular or monocular, he does not give a great history, he stating a transient. He also states he just generally feeling weak, not quite as sharp as he usually is. He does admit that he has a history of bladder cancer, he states is in remission, also has a remote history of atrial fibrillation, he is not anticoagulated. He is treated for high blood pressure, high cholesterol. Denies any ophthalmological issues, surgeries.     REVIEW OF SYSTEMS    General:  No fevers  Eyes:  See HPI .  ENT:  No hearing changes  Cardiovascular:  No chest pain, no palpitations  Respiratory:  No shortness of breath, no cough, no wheezing  Gastrointestinal:  No pain, no nausea, no vomiting  Musculoskeletal:  No muscle pain, no joint pain  Skin:  No rash  Neurologic:  See HPI  Genitourinary:  No dysuria, no hematuria  Endocrine:  No unexpected weight gain, no unexpected weight loss  Extremities:  no edema, no pain    All other review of systems are negative  See HPI and nursing notes for additional information      PAST MEDICAL OR SURGICAL HISTORY    Past Medical History: Diagnosis Date    Cancer [facility])     Family history of coronary artery disease     Hyperlipidemia     Hypertension     Paroxysmal atrial fibrillation (HCC)     Screening colonoscopy 11/19/2009    few diverticula,otherwise normal; recommended 10 year follow up per Dr Fox Velazquez    Sleep apnea, obstructive     CPAP       CURRENT MEDICATIONS    Current Outpatient Rx   Medication Sig Dispense Refill    valsartan (DIOVAN) 80 MG tablet TAKE 1 TABLET BY MOUTH EVERY DAY 90 tablet 3    rosuvastatin (CRESTOR) 20 MG tablet TAKE 1 TABLET BY MOUTH EVERY DAY 90 tablet 3    fluticasone (FLONASE) 50 MCG/ACT nasal spray USE 1 SPRAY IN EACH NOSTRIL EVERY DAY 1 Bottle 1    latanoprost (XALATAN) 0.005 % ophthalmic solution Place 1 drop into both eyes. Dr Orlando Rao  3    esomeprazole (NEXIUM) 20 MG capsule Take 20 mg by mouth every morning (before breakfast). OTC      EPINEPHrine (EPIPEN 2-BRUNA) 0.3 MG/0.3ML KIERAN injection Inject 0.3 mLs into the muscle once as needed for 1 dose.  1 Device 1       ALLERGIES    Allergies   Allergen Reactions    Peanut-Containing Drug Products Anaphylaxis     AVOIDS ALL NUTS    Zocor [Simvastatin]      Mild elevation of liver enzymes    Adhesive Tape Rash    Neosporin [Neomycin-Polymyxin B Gu] Rash       FAMILY OR SOCIAL HISTORY    Family History   Problem Relation Age of Onset    Coronary Art Dis Mother         S/P CABG    Hypertension Mother     High Cholesterol Mother     Coronary Art Dis Father         d. age 62 of MI and was a heavy smoker    High Cholesterol Brother     Hypertension Brother     Coronary Art Dis Brother        PHYSICAL EXAM    VITAL SIGNS: BP (!) 193/91   Pulse 92   Temp 98.4 °F (36.9 °C) (Oral)   Resp 20   Ht 6' 2\" (1.88 m)   Wt 205 lb (93 kg)   SpO2 100%   BMI 26.32 kg/m²   Constitutional:  Well developed, well nourished, no acute distress  Eyes:  Pupils equally round and reactive to light, sclera nonicteric  HENT:  External ears normal, nose normal, oropharynx moist  Neck/Lymphatics:  Trachea midline. No masses or swollen nodes. Respiratory:  No respiratory distress, normal breath sounds, no wheezing   Cardiovascular:  Regular rate and rhythm, normal S1 & S2, no extra heart sounds. GI:  Soft, nondistended, normal bowel sounds, nontender  Musculoskeletal:  No edema, no acute deformities   Integument:  Skin is warm and dry, no obvious rash    Vascular: Radial and DP pulses 2+ equal bilaterally    Neurologic:    - Alert & oriented person, place, time, and situation, no speech difficulties or slurring.  - No obvious gross motor deficits  - Cranial nerves 2-12 grossly intact  - Sensation intact to light touch  - Strength 5/5 in upper and lower extremities bilaterally  - Normal finger to nose test bilaterally  - Rapid alternating movements intact  - Normal heel-shin bilaterally  - No pronator drift. - Romberg negative. - Light touch sensation intact throughout. - Upper and lower extremity DTRs 2+ bilaterally.   - Gait steady and without difficulty  -No truncal ataxia      ----------------------------------------------------------------------------------------------------------------------    NIH Stroke Scale    (1A) LOC :   - 0  (alert, keenly responsive)  (1B) LOC Questions: (Month / Age)    - 0 (answers both questions correctly)  (1C) LOC Command:  (Open close eyes;  make fist, let go)    - 0  (performs both tasks correctly)  (2) Best Gaze:  (Eyes open-patient follows finger or face)   - 0  (normal)  (3) Visual:  (Introduce visual stimulus to patient's visual field quadrants)   - 1  (partial hemianopia)  (4)  Facial palsy:  (Show teeth, raise eyebrows and squeeze eyes shut)   - 0  (normal, symmetrical movements)  (5A)  motor arm LEFT  (5B) motor arm RIGHT:  (Elevate extremity to 90° in sitting or 45° if supine -  score drift/movement)   - 0  (no drift-limb holds for full 10 seconds)  (6A)  motor leg LEFT:  (6B) motor leg RIGHT:  (Elevate extremity to 30° while supine and score drift/movement)   - 0  (no drift-leg hold for full 5 seconds  (7)  Limb Ataxia:  (Finger-nose, heel-shin)   - 0  (absent)  (8) Sensory:  (Pinprick to face, arms trunk, and leg-compare side to side)   - 0  (normal-no sensory loss)  (9)  Best language: (Name items, describes a picture, read sentence-see attached testing cards)    - 0  (no aphasia-normal)  (10)  Dysarthria:  (Evaluate speech clarity by patient repeating listed words)   - 0  (normal articulation)  (11)  Extinction and inattention:  (Use information from prior testing to identify neglect)   - 0  (no neglect)    Total NIHSS:  NIH Stroke Scale  Interval: Baseline  Level of Consciousness (1a. ): Alert  LOC Questions (1b. ):  Answers both correctly  LOC Commands (1c. ): Performs both tasks correctly  Best Gaze (2. ): Normal  Visual (3. ): No visual loss  Facial Palsy (4. ): Normal symmetrical movement  Motor Arm, Left (5a. ): No drift  Motor Arm, Right (5b. ): No drift  Motor Leg, Left (6a. ): No drift  Motor Leg, Right (6b. ): No drift  Limb Ataxia (7. ): Absent  Sensory (8. ): Normal  Best Language (9. ): No aphasia  Dysarthria (10. ): Normal  Extinction and Inattention (11): No abnormality  Total: 0    ----------------------------------------------------------------------------------------------------------------------    LABS:  Results for orders placed or performed during the hospital encounter of 12/11/20   CBC Auto Differential   Result Value Ref Range    WBC 5.8 4.0 - 10.5 K/CU MM    RBC 4.64 4.6 - 6.2 M/CU MM    Hemoglobin 14.7 13.5 - 18.0 GM/DL    Hematocrit 42.4 42 - 52 %    MCV 91.4 78 - 100 FL    MCH 31.7 (H) 27 - 31 PG    MCHC 34.7 32.0 - 36.0 %    RDW 11.9 11.7 - 14.9 %    Platelets 258 926 - 560 K/CU MM    MPV 9.4 7.5 - 11.1 FL    Differential Type AUTOMATED DIFFERENTIAL     Segs Relative 59.6 36 - 66 %    Lymphocytes % 25.7 24 - 44 %    Monocytes % 11.8 (H) 0 - 4 %    Eosinophils % 1.9 0 - 3 %    Basophils % 0.5 0 - 1 % Segs Absolute 3.4 K/CU MM    Lymphocytes Absolute 1.5 K/CU MM    Monocytes Absolute 0.7 K/CU MM    Eosinophils Absolute 0.1 K/CU MM    Basophils Absolute 0.0 K/CU MM    Nucleated RBC % 0.0 %    Total Nucleated RBC 0.0 K/CU MM    Total Immature Neutrophil 0.03 K/CU MM    Immature Neutrophil % 0.5 (H) 0 - 0.43 %   Comprehensive Metabolic Panel w/ Reflex to MG   Result Value Ref Range    Sodium 130 (L) 135 - 145 MMOL/L    Potassium 3.7 3.5 - 5.1 MMOL/L    Chloride 94 (L) 99 - 110 mMol/L    CO2 25 21 - 32 MMOL/L    BUN 11 6 - 23 MG/DL    CREATININE 0.8 (L) 0.9 - 1.3 MG/DL    Glucose 116 (H) 70 - 99 MG/DL    Calcium 8.7 8.3 - 10.6 MG/DL    Alb 4.0 3.4 - 5.0 GM/DL    Total Protein 6.7 6.4 - 8.2 GM/DL    Total Bilirubin 0.4 0.0 - 1.0 MG/DL    ALT 64 (H) 10 - 40 U/L    AST 28 15 - 37 IU/L    Alkaline Phosphatase 74 40 - 129 IU/L    GFR Non-African American >60 >60 mL/min/1.73m2    GFR African American >60 >60 mL/min/1.73m2    Anion Gap 11 4 - 16   Troponin   Result Value Ref Range    Troponin T <0.010 <0.01 NG/ML   Protime-INR   Result Value Ref Range    Protime 11.6 (L) 11.7 - 14.5 SECONDS    INR 0.96 INDEX   Magnesium   Result Value Ref Range    Magnesium 1.8 1.8 - 2.4 mg/dl   POCT Glucose   Result Value Ref Range    Glucose 120 mg/dL    QC OK?  yes    POCT Glucose   Result Value Ref Range    POC Glucose 120 (H) 70 - 99 MG/DL   EKG 12 Lead   Result Value Ref Range    Ventricular Rate 91 BPM    Atrial Rate 91 BPM    P-R Interval 162 ms    QRS Duration 128 ms    Q-T Interval 380 ms    QTc Calculation (Bazett) 467 ms    P Axis 42 degrees    R Axis 23 degrees    T Axis 33 degrees    Diagnosis       Normal sinus rhythm  Nonspecific intraventricular block  Nonspecific T wave abnormality  Abnormal ECG  No previous ECGs available       EKG    See supervising physicians note for EKG interpretation    EKG Interpretation  Please see ED physician's note for EKG interpretation - Dr. Urmila Leone CONTRAST   Preliminary Result   Motion artifact degrades the images of the head and neck. No significant   abnormality of the neck vessels. There appears to be a prominent posterior communicating artery on the left or   fetal origin of the left posterior cerebral artery. There is mild   irregularity of the P1 segment of the left posterior cerebral artery   suggesting some atherosclerotic disease of the P1 segment. There is   atherosclerotic disease of the cavernous carotid arteries without any   significant narrowing of the vessels. No other significant abnormality of   the intracranial circulation. CT HEAD WO CONTRAST   Final Result   No acute intracranial abnormality. XR CHEST PORTABLE    (Results Pending)       ED COURSE & MEDICAL DECISION MAKING        Vital signs and nursing notes reviewed during ED course. Patient care and presentation staffed and seen in conjunction with supervising physician, Dr. Summer Mayo. Patient presents as above. Had transient peripheral vision loss of the right side around 1230. Was brought in via private vehicle, initially stating that he has had complete resolution of his symptoms, during my evaluation he states that he still had a slight visual disturbance of his right-sided peripheral vision. Otherwise neurologically intact, his NIH stroke scale was 1 at this point. Secondary to the transient nature of his symptoms and it representing during my examination a stroke alert was called. Patient's point-of-care glucose within normal limits, Noncon of the CT of the head was acutely negative. Patient had evaluation by Dr. Paolo Bill, [facility] neurology, did not recommend TPA secondary to the transient nature of his symptoms, during her evaluation he had complete resolution of symptoms. He does have risk factors including history of cancer, remote A. fib, is not anticoagulated.   Dr. Paolo Bill recommending admission for further TIA work-up, possible anticoagulants for his history of A. fib. Patient's work-up otherwise appears well, his CTA of the head and neck does demonstrate some atherosclerotic evidence of the P1 segment of the left posterior cerebral artery which does correlate with his visual disturbances. Patient will be admitted to the hospital for further evaluation, work-up from these transient visual loss and possible neurologic TIA. I consulted with hospitalist,, and we discussed the patient's case. Hospitalist agrees to admits the patient at this time for further evaluation and care. All pertinent Lab data and radiographic results reviewed with patient at bedside. The patient and/or the family were informed of the results of any tests/labs/imaging, the treatment plan, and time was allotted to answer questions. Diagnosis, disposition, and treatment plan reviewed in detail with patient. Patient understands and agrees to admission at this time. CRITICAL CARE NOTE:  There was a high probability of clinically significant life-threatening deterioration of the patient's condition requiring my urgent intervention due to patient's transient visual loss, stroke alert, consultations. Total critical care time is 30  minutes. This includes vital sign monitoring, pulse oximetry monitoring, telemetry monitoring, clinical response to the IV medications, reviewing the nursing notes, consultation time, dictation/documentation time, and interpretation of the lab work. This time excludes time spent performing procedures and separately billable procedures and family discussion time. Clinical  IMPRESSION    1. TIA (transient ischemic attack)    2. Loss of peripheral visual field, right    3.  Abnormal computed tomography angiography of head      PLAN  Admit      Comment: Please note this report has been produced using speech recognition software and may contain errors related to that system including errors in grammar, punctuation, and spelling, as well as words and phrases that may be inappropriate. If there are any questions or concerns please feel free to contact the dictating provider for clarification.       Acosta Kenney, PA  12/11/20 [id number]

## 2020-12-11 NOTE — PROGRESS NOTES
Medication History  [facility]    Patient Name: Dary Harmon 1955     Medication history has been completed by: Lay Washington CPhT    Source(s) of information: patient and insurance claims     Primary Care Physician: Devin Montez MD     Pharmacy: [facility]    Allergies as of 12/11/2020 - Review Complete 12/11/2020   Allergen Reaction Noted    Peanut-containing drug products Anaphylaxis 05/27/2018    Zocor [simvastatin]      Adhesive tape Rash 07/31/2018    Neosporin [neomycin-polymyxin b gu] Rash 11/20/2011        Prior to Admission medications    Medication Sig Start Date End Date Taking? Authorizing Provider   LUMIGAN 0.01 % SOLN ophthalmic drops Place 1 drop into both eyes nightly 12/7/20  Yes Historical Provider, MD   rosuvastatin (CRESTOR) 20 MG tablet  Take 20 mg by mouth daily  4/15/20  Yes [address] [place] [address], DO   esomeprazole (NEXIUM) 20 MG capsule Take 20 mg by mouth every morning (before breakfast). OTC   Yes Historical Provider, MD   valsartan (DIOVAN) 80 MG tablet Take 80 mg by mouth daily  4/15/20   Zackibis De Los Santos Ao, DO   EPINEPHrine (EPIPEN 2-BRUNA) 0.3 MG/0.3ML KIERAN injection Inject 0.3 mLs into the muscle once as needed for 1 dose. 2/2/12   Estee Ureña MD     Medications added or changed (ex. new medication, dosage change, interval change, formulation change):  Latanoprost changed to Lumigan     Comments:  Medication list reviewed with patient and insurance claims verified.'  Patient reports he has taken first dose of medications today.     To my knowledge the above medication history is accurate as of 12/11/2020 2:46 PM.   Lay Washington CPhT   12/11/2020 2:46 PM

## 2020-12-11 NOTE — ED NOTES
[facility] FAXED TO Ondina Fitzpatrick  12/11/20 7031 Chest x-ray is old and shows nothing that would cause her extent of shortness of breath. ED now.

## 2020-12-11 NOTE — H&P
History and Physical      Name:  Bowen Levin /Age/Sex: 1955  (60 y.o. male)   MRN & CSN:  [id number] & [id number] Admission Date/Time: 2020  1:22 PM   Location:  [facility] PCP: Sheldon Howard MD       Admitting Physicians : Dr. Jorge Shaw is a 60 y.o.  male  who presents with Loss of Vision (in right eye)    Assessment and Plan:   Right eye vision loss concerning for TIA   CTA neck and head with no significant abnormality of the neck vessels. There appears to be a prominent posterior communicating artery on the left for fetal origin of the left posterior cerebral artery. There is mild irregularity of the P1 segment of the left posterior cerebral artery suggesting some atherosclerotic disease.  There is atherosclerotic disease of the cavernous carotid arteries without any significant narrowing of the vessel.  -Start aspirin and continue Crestor  -Check lipid panel and A1c  -Neurochecks  -MRI brain  -Neuro consult  -F/U with Ophthmologist as outpatient    HTN, uncontrolled  -Hold valsartan to allow permissive hypertension  -Labetalol IV as needed for /110   -Monitor BP    PAF, stable  -Not on medications    GERD  -Continue protonix    HLD  -Continue Crestor      Bladder cancer  -In remission for 1 year  -Follows-up at [facility]        DVT-PPX: Lovenox  Diet: Cardiac      Medications:   Medications:    latanoprost  1 drop Both Eyes Nightly    rosuvastatin  20 mg Oral Daily    [START ON 2020] pantoprazole  40 mg Oral QAM AC    sodium chloride flush  10 mL Intravenous 2 times per day    enoxaparin  40 mg Subcutaneous Daily    aspirin  81 mg Oral Daily    Or    aspirin  300 mg Rectal Daily      Infusions:    sodium chloride       PRN Meds: sodium chloride flush, 10 mL, PRN  polyethylene glycol, 17 g, Daily PRN  promethazine, 12.5 mg, Q6H PRN    Or  ondansetron, 4 mg, Q6H PRN  labetalol, 10 mg, Q10 Min PRN        Current Facility-Administered History:   Diagnosis Date    Cancer ([facility]     Family history of coronary artery disease     Hyperlipidemia     Hypertension     Paroxysmal atrial fibrillation ([facility]     Screening colonoscopy 2009    few diverticula,otherwise normal; recommended 10 year follow up per Dr Keke Bal Sleep apnea, obstructive     CPAP     PSHX:  has no past surgical history on file. Allergies: Allergies   Allergen Reactions    Peanut-Containing Drug Products Anaphylaxis     AVOIDS ALL NUTS    Zocor [Simvastatin]      Mild elevation of liver enzymes    Adhesive Tape Rash    Neosporin [Neomycin-Polymyxin B Gu] Rash       FAM HX: family history includes Coronary Art Dis in his brother, father, and mother; High Cholesterol in his brother and mother; Hypertension in his brother and mother. Family history reviewed and is essentially negative unless as stated above.      Soc HX:   Social History     Socioeconomic History    Marital status:      Spouse name: None    Number of children: None    Years of education: None    Highest education level: None   Occupational History    None   Social Needs    Financial resource strain: None    Food insecurity     Worry: None     Inability: None    Transportation needs     Medical: None     Non-medical: None   Tobacco Use    Smoking status: Former Smoker     Packs/day: 0.50     Years: 5.00     Pack years: 2.50     Types: Cigarettes     Last attempt to quit: 1983     Years since quittin.9    Smokeless tobacco: Never Used    Tobacco comment: 4-pack year history of cigarette smoking   Substance and Sexual Activity    Alcohol use: None    Drug use: None    Sexual activity: None   Lifestyle    Physical activity     Days per week: None     Minutes per session: None    Stress: None   Relationships    Social connections     Talks on phone: None     Gets together: None     Attends Sikhism service: None     Active member of club or organization: None

## 2020-12-11 NOTE — ED PROVIDER NOTES
I independently examined and evaluated Lina Ng. In brief their history revealed right-sided vision loss that occurred today while he was sitting in front of a computer, around 1230. States that then the vision came back. Denies focal extremity weakness, numbness, tingling. No vertigo, syncope, chest pain or palpitations. Does have history of A. fib. Not taking any anticoagulants. Their focused exam revealed awake, alert, well-hydrated, well-nourished, and in no acute distress. Mucous membranes are moist. Speech is clear. Breathing is unlabored. Skin is dry. Mental status is normal. The patient moves all extremities, and is without facial droop. This EKG was interpreted by me. Rate is 91, rhythm is sinus. IA and QT intervals are within normal limits. There is no ST segment or T wave changes. This EKG was compared to previous EKG from date 3/27/19. ED course: This is a 59-year-old male who presented with right-sided visual deficit which has since resolved. After his initial presentation, resolution I did occur 1 more time, and resolved again on its own. Stroke alert was called, however as the symptoms resolved on its own no further intervention. Given his history of atrial fibrillation without anticoagulation, symptoms that may be caused by TIA did recommend admission to the hospital for further evaluation and care after consultation with stroke neurologist Dr. Linda Wilson. All diagnostic, treatment, and disposition decisions were made by myself in conjunction with the Advanced Practice Provider. For all further details of the patient's emergency department visit, please see the Advanced Practice Provider's documentation.       Jovanni Zuleta, DO  12/11/20 2318

## 2020-12-11 NOTE — ED NOTES
Joaquin rainbow (extra green), type & screen from patient's right median AC. Nurses (Renu Faye) present during procedure.       Juani Mcclain  12/11/20 [id number]

## 2020-12-11 NOTE — ED NOTES
Full report given to Rose Mary TAYLOR, No further questions at this time.      Titus Rhodes RN  12/11/20 3417

## 2020-12-12 ENCOUNTER — APPOINTMENT (OUTPATIENT)
Dept: MRI IMAGING | Age: 65
End: 2020-12-12
Payer: COMMERCIAL

## 2020-12-12 VITALS
WEIGHT: 205 LBS | HEART RATE: 95 BPM | OXYGEN SATURATION: 97 % | SYSTOLIC BLOOD PRESSURE: 157 MMHG | HEIGHT: 74 IN | DIASTOLIC BLOOD PRESSURE: 86 MMHG | TEMPERATURE: 97.9 F | BODY MASS INDEX: 26.31 KG/M2 | RESPIRATION RATE: 19 BRPM

## 2020-12-12 PROBLEM — G45.9 TIA (TRANSIENT ISCHEMIC ATTACK): Status: RESOLVED | Noted: 2020-12-11 | Resolved: 2020-12-12

## 2020-12-12 LAB
CHOLESTEROL: 142 MG/DL
EKG ATRIAL RATE: 80 BPM
EKG DIAGNOSIS: NORMAL
EKG P AXIS: 38 DEGREES
EKG P-R INTERVAL: 162 MS
EKG Q-T INTERVAL: 394 MS
EKG QRS DURATION: 116 MS
EKG QTC CALCULATION (BAZETT): 454 MS
EKG R AXIS: 10 DEGREES
EKG T AXIS: 20 DEGREES
EKG VENTRICULAR RATE: 80 BPM
ESTIMATED AVERAGE GLUCOSE: 117 MG/DL
HBA1C MFR BLD: 5.7 % (ref 4.2–6.3)
HCT VFR BLD CALC: 38.6 % (ref 42–52)
HDLC SERPL-MCNC: 42 MG/DL
HEMOGLOBIN: 13.4 GM/DL (ref 13.5–18)
LDL CHOLESTEROL DIRECT: 90 MG/DL
MCH RBC QN AUTO: 32.1 PG (ref 27–31)
MCHC RBC AUTO-ENTMCNC: 34.7 % (ref 32–36)
MCV RBC AUTO: 92.3 FL (ref 78–100)
PDW BLD-RTO: 12 % (ref 11.7–14.9)
PLATELET # BLD: 157 K/CU MM (ref 140–440)
PMV BLD AUTO: 9.8 FL (ref 7.5–11.1)
RBC # BLD: 4.18 M/CU MM (ref 4.6–6.2)
TRIGL SERPL-MCNC: 137 MG/DL
WBC # BLD: 7.7 K/CU MM (ref 4–10.5)

## 2020-12-12 PROCEDURE — 83036 HEMOGLOBIN GLYCOSYLATED A1C: CPT

## 2020-12-12 PROCEDURE — 70551 MRI BRAIN STEM W/O DYE: CPT

## 2020-12-12 PROCEDURE — 80061 LIPID PANEL: CPT

## 2020-12-12 PROCEDURE — 36415 COLL VENOUS BLD VENIPUNCTURE: CPT

## 2020-12-12 PROCEDURE — G0378 HOSPITAL OBSERVATION PER HR: HCPCS

## 2020-12-12 PROCEDURE — 2580000003 HC RX 258: Performed by: NURSE PRACTITIONER

## 2020-12-12 PROCEDURE — 94761 N-INVAS EAR/PLS OXIMETRY MLT: CPT

## 2020-12-12 PROCEDURE — 92610 EVALUATE SWALLOWING FUNCTION: CPT

## 2020-12-12 PROCEDURE — 96372 THER/PROPH/DIAG INJ SC/IM: CPT

## 2020-12-12 PROCEDURE — 6370000000 HC RX 637 (ALT 250 FOR IP): Performed by: NURSE PRACTITIONER

## 2020-12-12 PROCEDURE — 83721 ASSAY OF BLOOD LIPOPROTEIN: CPT

## 2020-12-12 PROCEDURE — 6360000002 HC RX W HCPCS: Performed by: NURSE PRACTITIONER

## 2020-12-12 PROCEDURE — 85027 COMPLETE CBC AUTOMATED: CPT

## 2020-12-12 PROCEDURE — 93005 ELECTROCARDIOGRAM TRACING: CPT | Performed by: NURSE PRACTITIONER

## 2020-12-12 PROCEDURE — 93010 ELECTROCARDIOGRAM REPORT: CPT | Performed by: INTERNAL MEDICINE

## 2020-12-12 PROCEDURE — 99220 PR INITIAL OBSERVATION CARE/DAY 70 MINUTES: CPT | Performed by: NURSE PRACTITIONER

## 2020-12-12 PROCEDURE — 99204 OFFICE O/P NEW MOD 45 MIN: CPT | Performed by: INTERNAL MEDICINE

## 2020-12-12 RX ORDER — ASPIRIN 81 MG/1
81 TABLET ORAL DAILY
Qty: 90 TABLET | Refills: 3 | Status: SHIPPED | OUTPATIENT
Start: 2020-12-13

## 2020-12-12 RX ADMIN — ENOXAPARIN SODIUM 40 MG: 100 INJECTION SUBCUTANEOUS at 09:53

## 2020-12-12 RX ADMIN — SODIUM CHLORIDE: 9 INJECTION, SOLUTION INTRAVENOUS at 05:56

## 2020-12-12 RX ADMIN — PANTOPRAZOLE SODIUM 40 MG: 40 TABLET, DELAYED RELEASE ORAL at 05:56

## 2020-12-12 RX ADMIN — ASPIRIN 81 MG: 81 TABLET, COATED ORAL at 09:53

## 2020-12-12 NOTE — CONSULTS
Daily    Or    aspirin  300 mg Rectal Daily    rosuvastatin  20 mg Oral Daily     Continuous Infusions:   sodium chloride 100 mL/hr at 20 0556     PRN Meds:.sodium chloride flush, polyethylene glycol, promethazine **OR** ondansetron, labetalol    Allergies   Allergen Reactions    Peanut-Containing Drug Products Anaphylaxis     AVOIDS ALL NUTS    Zocor [Simvastatin]      Mild elevation of liver enzymes    Adhesive Tape Rash    Neosporin [Neomycin-Polymyxin B Gu] Rash     Social History     Socioeconomic History    Marital status:      Spouse name: Not on file    Number of children: Not on file    Years of education: Not on file    Highest education level: Not on file   Occupational History    Not on file   Social Needs    Financial resource strain: Not on file    Food insecurity     Worry: Not on file     Inability: Not on file    Transportation needs     Medical: Not on file     Non-medical: Not on file   Tobacco Use    Smoking status: Former Smoker     Packs/day: 0.50     Years: 5.00     Pack years: 2.50     Types: Cigarettes     Quit date: 1983     Years since quittin.9    Smokeless tobacco: Never Used    Tobacco comment: 4-pack year history of cigarette smoking   Substance and Sexual Activity    Alcohol use: Not on file    Drug use: Not on file    Sexual activity: Not on file   Lifestyle    Physical activity     Days per week: Not on file     Minutes per session: Not on file    Stress: Not on file   Relationships    Social connections     Talks on phone: Not on file     Gets together: Not on file     Attends Confucianism service: Not on file     Active member of club or organization: Not on file     Attends meetings of clubs or organizations: Not on file     Relationship status: Not on file    Intimate partner violence     Fear of current or ex partner: Not on file     Emotionally abused: Not on file     Physically abused: Not on file     Forced sexual activity: Not on file   Other Topics Concern    Not on file   Social History Narrative    Not on file      Family History   Problem Relation Age of Onset    Coronary Art Dis Mother         S/P CABG    Hypertension Mother     High Cholesterol Mother     Coronary Art Dis Father         d. age 62 of MI and was a heavy smoker    High Cholesterol Brother     Hypertension Brother     Coronary Art Dis Brother        Review of Symptoms:    14-point system review completed. All of which are negative except as mentioned above. Physical Exam:           Gen: A&O x 4, NAD, cooperative  HEENT: NC/AT, EOMI, PERRL, mmm neck supple, no meningeal signs;appreciated  Heart: regular   Lungs: no distress  Ext: no edema, no calf tenderness b/l  Psych: normal mood and affect  Skin: no rashes or lesions    NEUROLOGIC EXAM:    Mental Status: A&O to self, location, month and year, NAD, speech clear, language fluent, repetition and naming intact, follows commands appropriately    Cranial Nerve Exam:   CN II-XII:   PERRL, VFF, no nystagmus, no gaze paresis, sensation V1-V3 intact b/l, muscles of facial expression symmetric; hearing intact to conversational tone, palate elevates symmetrically, shoulder elevation symmetric and tongue protrudes midline with movement side to side.     Motor Exam:       Strength 5/5 UE's/LE's b/l  Tone and bulk normal   No pronator drift    Deep Tendon Reflexes: 2/4 biceps, triceps, brachioradialis, patellar, and achilles b/l; flexor plantar responses b/l    Sensation: Intact light touch UE's/LE's b/l    Coordination/Cerebellum:       Tremors--none      Rapidly alternating movements: no dysdiadochokinesia b/l                Heel-to-Shin: no dysmetria b/l      Finger-to-Nose: no dysmetria b/l    Gait and stance:      Gait: steady no ataxia       LABS:     Recent Labs     12/11/20  1404 12/11/20  1406 12/12/20  0153   WBC 5.8  --  7.7   *  --   --    K 3.7  --   --    CL 94*  --   --    CO2 25  --   --    BUN 11  -- --    CREATININE 0.8*  --   --    GLUCOSE 116* 120  --    INR 0.96  --   --      Results for Ric Katz (MRN [id number]) as of 12/12/2020 11:16   Ref. Range 12/12/2020 01:53   Hemoglobin A1C Latest Ref Range: 4.2 - 6.3 % 5.7     Ref Range & Units Value   Triglycerides  <150 MG/   Cholesterol  <200 MG/   Comment: (NOTE) Cardiovascular risk evaluation guidelines: Low Risk <200 mg/dL Average Risk 200-240 mg/dL High Risk >240 mg/dL    HDL Cholesterol  >40 MG/DL 42   LDL Direct  <100 MG/DL 90   View Full Results Report    IMAGING:    MRI brain:  1. Acute infarcts are noted in the left posterior cerebral artery vascular   distribution, primarily involving the left hippocampus and left optic   radiations, likely accounting for the patient's visual disturbances. 2. Cerebral parenchymal volume loss with mild chronic microvascular white   matter ischemic disease. CTA;  Motion artifact degrades the images of the head and neck.  No significant   abnormality of the neck vessels.       There appears to be a prominent posterior communicating artery on the left or   fetal origin of the left posterior cerebral artery.  There is mild   irregularity of the P1 segment of the left posterior cerebral artery   suggesting some atherosclerotic disease of the P1 segment.  There is   atherosclerotic disease of the cavernous carotid arteries without any   significant narrowing of the vessels.  No other significant abnormality of   the intracranial circulation.      ECHO pending            ASSESSMENT/PLAN:     3 60year old male with right hemianopsia now resolved secondary to acute L-PCA infarction superimposed on hx of A. Fib, MERCEDES and HTN. Plan of care as follows:  1. Neuro Exam:  1. Non focal   2. Neurodiagnostics:  1. MRI as above  2. CT as above  3. ECHO outpatient   3. Medications:  1. ASA 81mg daily  2. Statin daily  3. [address] can start on 12/17  4. PT/OT/ST:  1. Per their recommendations  5.  Follow up:  1. Cardiology to see outpatient for A. Fib monitoring and recommendations on [address]         Thank you for allowing us to participate in the care of your patient. If there are any questions regarding evaluation please feel free to contact us.      Marylen Ko, APRN - KELLEE, 12/12/2020

## 2020-12-12 NOTE — PROGRESS NOTES
Speech Language Pathology  Facility/Department: [address] [place] [address]   CLINICAL BEDSIDE SWALLOW EVALUATION    NAME: Dmitriy Corrales  : 1955  MRN: [id number]    ADMISSION DATE: 2020  ADMITTING DIAGNOSIS: has Essential hypertension; Mixed hyperlipidemia; GERD (gastroesophageal reflux disease); Atrial fibrillation ([address] Glaucoma; Malignant neoplasm of posterior wall of urinary bladder ([address] Glucose intolerance; Vitamin D deficiency; MERCEDES (obstructive sleep apnea); Ex-smoker; and Hypersomnia on their problem list.  ONSET DATE: 20  Recent Chest Xray: 20-   No radiographic evidence of acute pulmonary abnormality seen.  Overall, no   significant changes from the prior study identified. MRI brain- 20-  1. Acute infarcts are noted in the left posterior cerebral artery vascular   distribution, primarily involving the left hippocampus and left optic   radiations, likely accounting for the patient's visual disturbances. 2. Cerebral parenchymal volume loss with mild chronic microvascular white   matter ischemic disease. Date of Eval: 2020  Evaluating Therapist:Socorro Morales MS, CCC-SLP  Current Diet level:  Current Diet : Regular  Current Liquid Diet : Thin    Primary Complaint  Patient Complaint: none reported    IMPRESSIONS: Dmitriy Corrales participated in a BSE this date following admission for L posterior cerebral artery infarcts involving L hippocampus and L optic radiations. Pt was seated EOB when SLP arrived. He is alert and oriented x4. Denies speech/language/swallowing concerns and states vision is returning to normal following R visual field cut beginning yesterday 20. Pt completed OME with no focal deficits noted, hyolaryngeal elevation WFL per palpation, clear vocal quality, adequate throat clear. Pt given thin liquid via cup/straw and hard solid pt independent and 0 overt s/s of aspiration across all consistencies.      Recommend continue regular diet/thin liquids. No acute SLP needs at this time. Reason for Referral  Sharona Villagomez was referred for a bedside swallow evaluation to assess the efficiency of his swallow function, identify signs and symptoms of aspiration and make recommendations regarding safe dietary consistencies, effective compensatory strategies, and safe eating environment. Impression  Dysphagia Diagnosis: Swallow function appears grossly intact  Dysphagia Outcome Severity Scale: Level 7: Normal in all situations     Treatment Plan  Requires SLP Intervention: No  Duration/Frequency of Treatment: n/a          Recommended Diet and Intervention                   Compensatory Swallowing Strategies       Treatment/Goals       General  Chart Reviewed: Yes  Behavior/Cognition: Alert; Cooperative;Pleasant mood  Respiratory Status: Room air  O2 Device: None (Room air)  Communication Observation: Functional  Follows Directions: Complex  Dentition: Adequate  Patient Positioning: Upright in bed  Baseline Vocal Quality: Normal  Volitional Cough: Strong  Prior Dysphagia History: none reported  Consistencies Administered: Reg solid; Thin - cup; Thin - straw           Vision/Hearing  Vision  Vision: Within Functional Limits  Hearing  Hearing: Within functional limits    Oral Motor Deficits  Oral/Motor  Oral Motor:  Within functional limits    Oral Phase Dysfunction  Oral Phase  Oral Phase: WFL     Indicators of Pharyngeal Phase Dysfunction   Pharyngeal Phase  Pharyngeal Phase: WFL    Prognosis       Education  Patient Education: procedures, results, recommendations  Patient Education Response: Verbalizes understanding             Therapy Time  SLP Individual Minutes  Time In: 1100  Time Out: 1110  Minutes: 10     SLP Total Treatment Time  Timed Code Treatment Minutes: 10 Minutes  Total Treatment Time: [address] Mart[address]nez MS, CCC-SLP  12/12/2020 11:12 AM

## 2020-12-12 NOTE — CONSULTS
CARDIOLOGY CONSULT NOTE   Reason for consultation:  AFIB    Referring physician:  Ledoan Bill MD     Primary care physician: Devin Montez MD      Dear   Thanks for the consult. History of present illness:Surendra is a 63 y. o.year old who  presents with LOSS OF vision, cardiology consult is called for afib and anticoagulation  Chief Complaint   Patient presents with    Loss of Vision     in right eye     Blood pressure, cholesterol, blood glucose and weight are well controlled. Past medical history:    has a past medical history of Cancer [facility]), Family history of coronary artery disease, Hyperlipidemia, Hypertension, Paroxysmal atrial fibrillation ([address] Screening colonoscopy, and Sleep apnea, obstructive. Past surgical history:   has no past surgical history on file. Social History:   reports that he quit smoking about 38 years ago. His smoking use included cigarettes. He has a 2.50 pack-year smoking history. He has never used smokeless tobacco.  Family history:   no family history of CAD, STROKE of DM    Allergies   Allergen Reactions    Peanut-Containing Drug Products Anaphylaxis     AVOIDS ALL NUTS    Zocor [Simvastatin]      Mild elevation of liver enzymes    Adhesive Tape Rash    Neosporin [Neomycin-Polymyxin B Gu] Rash       No current facility-administered medications for this encounter. No current facility-administered medications for this encounter. Current Outpatient Medications   Medication Sig Dispense Refill    aspirin 81 MG EC tablet Take 1 tablet by mouth daily 90 tablet 3    apixaban (ELIQUIS) 5 MG TABS tablet Take 1 tablet by mouth 2 times daily 60 tablet 3    LUMIGAN 0.01 % SOLN ophthalmic drops Place 1 drop into both eyes nightly      rosuvastatin (CRESTOR) 20 MG tablet TAKE 1 TABLET BY MOUTH EVERY DAY (Patient taking differently: Take 20 mg by mouth daily ) 90 tablet 3    esomeprazole (NEXIUM) 20 MG capsule Take 20 mg by mouth every morning (before breakfast).  OTC  valsartan (DIOVAN) 80 MG tablet TAKE 1 TABLET BY MOUTH EVERY DAY (Patient taking differently: Take 80 mg by mouth daily ) 90 tablet 3    EPINEPHrine (EPIPEN 2-BRUNA) 0.3 MG/0.3ML KIERAN injection Inject 0.3 mLs into the muscle once as needed for 1 dose. 1 Device 1     Review of Systems:   · Constitutional: No Fever or Weight Loss   · Eyes: No Decreased Vision  · ENT: No Headaches, Hearing Loss or Vertigo  · Cardiovascular: No chest pain, dyspnea on exertion, palpitations or loss of consciousness  · Respiratory: No cough or wheezing    · Gastrointestinal: No abdominal pain, appetite loss, blood in stools, constipation, diarrhea or heartburn  · Genitourinary: No dysuria, trouble voiding, or hematuria  · Musculoskeletal:  No gait disturbance, weakness or joint complaints  · Integumentary: No rash or pruritis  · Neurological: No TIA or stroke symptoms  · Psychiatric: No anxiety or depression  · Endocrine: No malaise, fatigue or temperature intolerance  · Hematologic/Lymphatic: No bleeding problems, blood clots or swollen lymph nodes  · Allergic/Immunologic: No nasal congestion or hives  All systems negative except as marked. ·   ·      Physical Examination:    Vitals:    12/12/20 0945   BP: (!) 157/86   Pulse: 95   Resp: 19   Temp: 97.9 °F (36.6 °C)   SpO2: 97%      Wt Readings from Last 3 Encounters:   12/11/20 205 lb (93 kg)   02/11/20 204 lb 9.6 oz (92.8 kg)   11/14/19 208 lb 4.8 oz (94.5 kg)     Body mass index is 26.32 kg/m². General Appearance:  No distress, conversant    Constitutional:  Well developed, Well nourished, No acute distress, Non-toxic appearance. HENT:  Normocephalic, Atraumatic, Bilateral external ears normal, Oropharynx moist, No oral exudates, Nose normal. Neck- Normal range of motion, No tenderness, Supple, No stridor,no apical-carotid delay, no carotid bruit  Eyes:  PERRL, EOMI, Conjunctiva normal, No discharge.    Respiratory:  Normal breath sounds, No respiratory distress, No wheezing, No chest tenderness. ,no use of accessory muscles, diaphragm movement is normal  Cardiovascular: (PMI) apex non displaced,no lifts no thrills, no s3,no s4, Normal heart rate, Normal rhythm, No murmurs, No rubs, No gallops. Carotid arteries pulse and amplitude are normal no bruit, no abdominal bruit noted ( normal abdominal aorta ausculation), femoral arteries pulse and amplitude are normal no bruit, pedal pulses are normal  GI:  Bowel sounds normal, Soft, No tenderness, No masses, No pulsatile masses, no hepatosplenomegally, no bruits  : External genitalia appear normal, No masses or lesions. No discharge. No CVA tenderness. Musculoskeletal:  Intact distal pulses, No edema, No tenderness, No cyanosis, No clubbing. Good range of motion in all major joints. No tenderness to palpation or major deformities noted. Back- No tenderness. Integument:  Warm, Dry, No erythema, No rash. Skin: no rash, no ulcers  Lymphatic:  No lymphadenopathy noted. Neurologic:  Alert & oriented x 3, Normal motor function, Normal sensory function, No focal deficits noted. Psychiatric:  Affect normal, Judgment normal, Mood normal.   Lab Review   No results for input(s): WBC, HGB, HCT, PLT in the last 72 hours. No results for input(s): NA, K, CL, CO2, PHOS, BUN, CREATININE in the last 72 hours. Invalid input(s): CA  No results for input(s): AST, ALT, ALB, BILIDIR, BILITOT, ALKPHOS in the last 72 hours. No results for input(s): TROPONINI in the last 72 hours. Lab Results   Component Value Date    BNP 3 11/20/2011     Lab Results   Component Value Date    INR 0.96 12/11/2020    PROTIME 11.6 (L) 12/11/2020         EKG:    Chest Xray:    ECHO:  Labs, echo, meds reviewed  Assessment: 63 y. o.year old with PMH of  has a past medical history of Cancer ([address] Family history of coronary artery disease, Hyperlipidemia, Hypertension, Paroxysmal atrial fibrillation ([address] Screening colonoscopy, and Sleep apnea, obstructive. Recommendations: Afib\"  ok to start eliqius for stroke with h/o PAf and ablation ( 1 yrs ago), will follow up in office  1. Dyslipidemia: continue statins  2. HTN: stable, continue valsartab  3. Sleep apnea  4. Health maintenance: exerise and diet  All labs, medications and tests reviewed, continue all other medications of all above medical condition listed as is.          Kate Santos MD, 1/14/2021 7:44 AM

## 2021-02-09 ENCOUNTER — VIRTUAL VISIT (OUTPATIENT)
Dept: PULMONOLOGY | Age: 66
End: 2021-02-09
Payer: COMMERCIAL

## 2021-02-09 DIAGNOSIS — G47.33 OSA (OBSTRUCTIVE SLEEP APNEA): ICD-10-CM

## 2021-02-09 DIAGNOSIS — G47.10 HYPERSOMNIA: ICD-10-CM

## 2021-02-09 DIAGNOSIS — Z87.891 EX-SMOKER: ICD-10-CM

## 2021-02-09 PROCEDURE — 99443 PR PHYS/QHP TELEPHONE EVALUATION 21-30 MIN: CPT | Performed by: INTERNAL MEDICINE

## 2021-02-09 ASSESSMENT — ENCOUNTER SYMPTOMS
EYE DISCHARGE: 0
ABDOMINAL DISTENTION: 0
SHORTNESS OF BREATH: 0
COUGH: 0
BACK PAIN: 0
EYE ITCHING: 0
ABDOMINAL PAIN: 0

## 2021-02-09 NOTE — PROGRESS NOTES
Jolly Lubin  1955  Referring Provider: Gela Beatty    Subjective:     Chief Complaint   Patient presents with    Sleep Apnea     compliance       HPI  Hedy Shelby is a 63 y.o. male is doing a telephone follow up visit. He has mild MERCEDES with EDS. He is on a CPAP of 8 cm h20 which he is using it every night about 7 to 8 hours. He says that it is helping him. He has no loss of weight. He has a nasal mask. He had ischemic CVA about 2 months with h/o Afib. He is on ASA and Eliquis. His 2 week download data showed that his residual AHI is 1.4 an leak is 31.5. Current Outpatient Medications   Medication Sig Dispense Refill    aspirin 81 MG EC tablet Take 1 tablet by mouth daily 90 tablet 3    LUMIGAN 0.01 % SOLN ophthalmic drops Place 1 drop into both eyes nightly      valsartan (DIOVAN) 80 MG tablet TAKE 1 TABLET BY MOUTH EVERY DAY (Patient taking differently: Take 80 mg by mouth daily ) 90 tablet 3    rosuvastatin (CRESTOR) 20 MG tablet TAKE 1 TABLET BY MOUTH EVERY DAY (Patient taking differently: Take 20 mg by mouth daily ) 90 tablet 3    esomeprazole (NEXIUM) 20 MG capsule Take 20 mg by mouth every morning (before breakfast). OTC      EPINEPHrine (EPIPEN 2-BRUNA) 0.3 MG/0.3ML KIERAN injection Inject 0.3 mLs into the muscle once as needed for 1 dose. 1 Device 1     No current facility-administered medications for this visit.         Allergies   Allergen Reactions    Peanut-Containing Drug Products Anaphylaxis     AVOIDS ALL NUTS    Zocor [Simvastatin]      Mild elevation of liver enzymes    Adhesive Tape Rash    Neosporin [Neomycin-Polymyxin B Gu] Rash       Past Medical History:   Diagnosis Date    [facility] [address]     Family history of coronary artery disease     Hyperlipidemia     Hypertension     Paroxysmal atrial fibrillation ([facility] [address]     Screening colonoscopy 11/19/2009    few diverticula,otherwise normal; recommended 10 year follow up per Dr Sindy Corbin    Sleep apnea, obstructive     CPAP No past surgical history on file. Social History     Socioeconomic History    Marital status:      Spouse name: Not on file    Number of children: Not on file    Years of education: Not on file    Highest education level: Not on file   Occupational History    Not on file   Social Needs    Financial resource strain: Not on file    Food insecurity     Worry: Not on file     Inability: Not on file    Transportation needs     Medical: Not on file     Non-medical: Not on file   Tobacco Use    Smoking status: Former Smoker     Packs/day: 0.50     Years: 5.00     Pack years: 2.50     Types: Cigarettes     Quit date: 1983     Years since quittin.1    Smokeless tobacco: Never Used    Tobacco comment: 4-pack year history of cigarette smoking   Substance and Sexual Activity    Alcohol use: Not on file    Drug use: Not on file    Sexual activity: Not on file   Lifestyle    Physical activity     Days per week: Not on file     Minutes per session: Not on file    Stress: Not on file   Relationships    Social connections     Talks on phone: Not on file     Gets together: Not on file     Attends Sabianist service: Not on file     Active member of club or organization: Not on file     Attends meetings of clubs or organizations: Not on file     Relationship status: Not on file    Intimate partner violence     Fear of current or ex partner: Not on file     Emotionally abused: Not on file     Physically abused: Not on file     Forced sexual activity: Not on file   Other Topics Concern    Not on file   Social History Narrative    Not on file       Review of Systems   Constitutional: Negative for fatigue. HENT: Negative for congestion and postnasal drip. Eyes: Negative for discharge and itching. Respiratory: Negative for cough and shortness of breath. Cardiovascular: Negative for chest pain and leg swelling. Gastrointestinal: Negative for abdominal distention and abdominal pain. Endocrine: Negative for cold intolerance and heat intolerance. Genitourinary: Negative for enuresis and frequency. Musculoskeletal: Negative for arthralgias and back pain. Allergic/Immunologic: Negative for environmental allergies and food allergies. Neurological: Negative for light-headedness and headaches. Hematological: Negative for adenopathy. Psychiatric/Behavioral: Negative for agitation and behavioral problems. Objective: There were no vitals taken for this visit. There is no height or weight on file to calculate BMI. Sleep Medicine 11/14/2019   Sitting and reading 1   Watching TV 1   Sitting, inactive in a public place (e.g. a theatre or a meeting) 0   As a passenger in a car for an hour without a break 1   Lying down to rest in the afternoon when circumstances permit 0   Sitting and talking to someone 0   Sitting quietly after a lunch without alcohol 0   In a car, while stopped for a few minutes in traffic 0   Total score 3   Neck circumference 16.5             Radiology: None    Assessment and Plan     Problem List        Pulmonary Problems    MERCEDES (obstructive sleep apnea)     Advised to be compliant with the CPAP  Loose weight            Other    Ex-smoker     Advised to c/w quitting smoking         Hypersomnia     Advised to be compliant with the CPAP  Loose weight                    Return in about 1 year (around 2/9/2022) for 2 week download data.      Progress notes sent to the referring Provider    Sommer Lozano MD  2/9/2021  9:44 AM Luisa Escobar is a 63 y.o. male being evaluated by a Virtual Visit (video visit) encounter to address concerns as mentioned above. A caregiver was present when appropriate. Due to this being a TeleHealth encounter (During [facility] public health emergency), evaluation of the following organ systems was limited: Vitals/Constitutional/EENT/Resp/CV/GI//MS/Neuro/Skin/Heme-Lymph-Imm. Pursuant to the emergency declaration under the [address] [place] [address] and the [employer] Act, this Virtual Visit was conducted with patient's (and/or legal guardian's) consent, to reduce the patient's risk of exposure to COVID-19 and provide necessary medical care. The patient (and/or legal guardian) has also been advised to contact this office for worsening conditions or problems, and seek emergency medical treatment and/or call 911 if deemed necessary. Patient identification was verified at the start of the visit: Yes    Total time spent for this encounter: 21 minutes    Services were provided through a video synchronous discussion virtually to substitute for in-person clinic visit. Patient and provider were located at their individual homes. --Jailyn Ellis MD on 2/9/2021 at 9:44 AM    An electronic signature was used to authenticate this note.

## 2021-12-09 LAB — ANTIBODY: NORMAL

## 2022-02-07 ENCOUNTER — OFFICE VISIT (OUTPATIENT)
Dept: PULMONOLOGY | Age: 67
End: 2022-02-07
Payer: COMMERCIAL

## 2022-02-07 VITALS
WEIGHT: 212.8 LBS | DIASTOLIC BLOOD PRESSURE: 74 MMHG | BODY MASS INDEX: 27.31 KG/M2 | OXYGEN SATURATION: 97 % | HEIGHT: 74 IN | SYSTOLIC BLOOD PRESSURE: 142 MMHG | HEART RATE: 67 BPM

## 2022-02-07 DIAGNOSIS — G47.10 HYPERSOMNIA: ICD-10-CM

## 2022-02-07 DIAGNOSIS — G47.33 OSA (OBSTRUCTIVE SLEEP APNEA): ICD-10-CM

## 2022-02-07 DIAGNOSIS — E66.3 OVERWEIGHT (BMI 25.0-29.9): ICD-10-CM

## 2022-02-07 DIAGNOSIS — Z87.891 EX-SMOKER: ICD-10-CM

## 2022-02-07 PROCEDURE — 99213 OFFICE O/P EST LOW 20 MIN: CPT | Performed by: INTERNAL MEDICINE

## 2022-02-07 ASSESSMENT — ENCOUNTER SYMPTOMS
COUGH: 0
ABDOMINAL DISTENTION: 0
EYE DISCHARGE: 0
BACK PAIN: 0
EYE ITCHING: 0
SHORTNESS OF BREATH: 0
ABDOMINAL PAIN: 0

## 2022-02-07 NOTE — PROGRESS NOTES
Yaritza Hook  1955  Referring Provider: Rachel Rosado MD    Subjective:     Chief Complaint   Patient presents with    1 Year Follow Up     sleep apnea- CPAP and 2 week download report Judith Linda. DONATO Waggoner is a 61 y.o. male has come back as a follow up. He has mild MERCEDES and he is using it every night about 7 to 8 hours. He says that it is helping him. He has no loss of weight. He is not sleepy during the day time. He has a nasal mask. His 2 week download data showed that his residual AHI is 1.8 and leak is 13.8 L/min. Current Outpatient Medications   Medication Sig Dispense Refill    aspirin 81 MG EC tablet Take 1 tablet by mouth daily 90 tablet 3    LUMIGAN 0.01 % SOLN ophthalmic drops Place 1 drop into both eyes nightly      valsartan (DIOVAN) 80 MG tablet TAKE 1 TABLET BY MOUTH EVERY DAY (Patient taking differently: Take 80 mg by mouth daily ) 90 tablet 3    rosuvastatin (CRESTOR) 20 MG tablet TAKE 1 TABLET BY MOUTH EVERY DAY (Patient taking differently: Take 20 mg by mouth daily ) 90 tablet 3    esomeprazole (NEXIUM) 20 MG capsule Take 20 mg by mouth every morning (before breakfast). OTC      EPINEPHrine (EPIPEN 2-BRUNA) 0.3 MG/0.3ML KIERAN injection Inject 0.3 mLs into the muscle once as needed for 1 dose. 1 Device 1     No current facility-administered medications for this visit. Allergies   Allergen Reactions    Peanut-Containing Drug Products Anaphylaxis     AVOIDS ALL NUTS    Zocor [Simvastatin]      Mild elevation of liver enzymes    Adhesive Tape Rash    Neosporin [Neomycin-Polymyxin B Gu] Rash       Past Medical History:   Diagnosis Date    Cancer ([facility] [address]     Family history of coronary artery disease     Hyperlipidemia     Hypertension     Paroxysmal atrial fibrillation ([facility] [address]     Screening colonoscopy 11/19/2009    few diverticula,otherwise normal; recommended 10 year follow up per Dr Autry Homans    Sleep apnea, obstructive     CPAP       History reviewed.  No pertinent surgical history. Social History     Socioeconomic History    Marital status:      Spouse name: None    Number of children: None    Years of education: None    Highest education level: None   Occupational History    None   Tobacco Use    Smoking status: Former Smoker     Packs/day: 0.50     Years: 5.00     Pack years: 2.50     Types: Cigarettes     Quit date: 1983     Years since quittin.1    Smokeless tobacco: Never Used    Tobacco comment: 4-pack year history of cigarette smoking   Vaping Use    Vaping Use: None   Substance and Sexual Activity    Alcohol use: None    Drug use: None    Sexual activity: None   Other Topics Concern    None   Social History Narrative    None     Social Determinants of Health     Financial Resource Strain:     Difficulty of Paying Living Expenses: Not on file   Food Insecurity:     Worried About Running Out of Food in the Last Year: Not on file    [place] of Food in the Last Year: Not on file   Transportation Needs:     Lack of Transportation (Medical): Not on file    Lack of Transportation (Non-Medical):  Not on file   Physical Activity:     Days of Exercise per Week: Not on file    Minutes of Exercise per Session: Not on file   Stress:     Feeling of Stress : Not on file   Social Connections:     Frequency of Communication with Friends and Family: Not on file    Frequency of Social Gatherings with Friends and Family: Not on file    Attends Lutheran Services: Not on file    Active Member of Clubs or Organizations: Not on file    Attends Club or Organization Meetings: Not on file    Marital Status: Not on file   Intimate Partner Violence:     Fear of Current or Ex-Partner: Not on file    Emotionally Abused: Not on file    Physically Abused: Not on file    Sexually Abused: Not on file   Housing Stability:     Unable to Pay for Housing in the Last Year: Not on file    Number of [place] in the Last Year: Not on file    Unstable Housing in the Last Year: Not on file       Review of Systems   Constitutional: Negative for fatigue. HENT: Negative for congestion and postnasal drip. Eyes: Negative for discharge and itching. Respiratory: Negative for cough and shortness of breath. Cardiovascular: Negative for chest pain and leg swelling. Gastrointestinal: Negative for abdominal distention and abdominal pain. Endocrine: Negative for cold intolerance and heat intolerance. Genitourinary: Negative for enuresis and frequency. Musculoskeletal: Negative for arthralgias and back pain. Allergic/Immunologic: Negative for environmental allergies and food allergies. Neurological: Negative for light-headedness and headaches. Hematological: Negative for adenopathy. Psychiatric/Behavioral: Negative for agitation and behavioral problems. Objective:   BP (!) 142/74   Pulse 67   Ht 6' 2\" (1.88 m)   Wt 212 lb 12.8 oz (96.5 kg)   SpO2 97%   BMI 27.32 kg/m²   Body mass index is 27.32 kg/m². Sleep Medicine 11/14/2019   Sitting and reading 1   Watching TV 1   Sitting, inactive in a public place (e.g. a theatre or a meeting) 0   As a passenger in a car for an hour without a break 1   Lying down to rest in the afternoon when circumstances permit 0   Sitting and talking to someone 0   Sitting quietly after a lunch without alcohol 0   In a car, while stopped for a few minutes in traffic 0   Total score 3   Neck circumference 16.5     Mallampati 3    Physical Exam  Vitals reviewed. Constitutional:       Appearance: Normal appearance. HENT:      Head: Normocephalic and atraumatic. Nose: Nose normal.      Mouth/Throat:      Mouth: Mucous membranes are moist.   Eyes:      Extraocular Movements: Extraocular movements intact. Pupils: Pupils are equal, round, and reactive to light. Cardiovascular:      Rate and Rhythm: Normal rate and regular rhythm. Pulses: Normal pulses. Heart sounds: Normal heart sounds. Pulmonary:      Effort: Pulmonary effort is normal.      Breath sounds: Normal breath sounds. Abdominal:      General: Abdomen is flat. Palpations: Abdomen is soft. Musculoskeletal:         General: Normal range of motion. Cervical back: Normal range of motion and neck supple. Skin:     General: Skin is warm and dry. Neurological:      General: No focal deficit present. Mental Status: He is alert and oriented to person, place, and time. Psychiatric:         Mood and Affect: Mood normal.         Behavior: Behavior normal.         Radiology: None    Assessment and Plan     Problem List        Pulmonary Problems    MERCEDES (obstructive sleep apnea)      Advised to be compliant with the CPAP  Loose weight            Other    Ex-smoker      Advised to c/w quitting smoking         Hypersomnia      Advised to be compliant with the CPAP  Loose weight         Overweight (BMI 25.0-29. 9)      Advised to loose weight                    Follow-Up:    Return in about 1 year (around 2/7/2023) for 2 week download data.      Progress notes sent to the referring Provider    Renée Rasheed MD MD  2/7/2022  10:48 AM

## 2022-05-17 ENCOUNTER — ANESTHESIA EVENT (OUTPATIENT)
Dept: OPERATING ROOM | Age: 67
End: 2022-05-17
Payer: COMMERCIAL

## 2022-05-17 NOTE — PROGRESS NOTES
Left message with the following information:     Surgery Date:  5/18/22                                  Arrive at: 1100  Surgery time: 1300     If your arrival time is before 7 AM come in the emergency room entrance. If after 7 AM come in the main entrance. Two visitors may accompany you to the hospital.  Everyone must wear a mask and be free of covid symptoms. 1. Do not eat or drink anything after midnight - unless instructed by your doctor prior to surgery. This includes                  no water, chewing gum or mints. 2. Follow your directions as prescribed by the doctor for your procedure and medications. Take the following medications with a small sip of water the morning of: Nexium, Diovan              3. Check with your Doctor regarding stopping Plavix, Coumadin, Lovenox, Effient, Pradaxa, Xarelto, Fragmin or other blood thinners and                  follow their instructions. 4. Do not smoke and do not drink any alcoholic beverages 24 hours prior to surgery. 5. You may brush your teeth and gargle the morning of surgery. DO NOT SWALLOW WATER  6. Please wear simple, loose fitting clothing to the hospital.  Louvella Castleman not bring valuables (money, credit cards, checkbooks, etc.) Do not wear any                  makeup (including no eye makeup) or nail polish on your fingers or toes. 7.  DO NOT wear any jewelry or piercings on day of surgery. All body piercing jewelry must be removed. 8.  Take a shower the night before or morning of your procedure, do not apply any lotion, oil or powder. 9. If you have dentures, they will be removed before going to the OR; we will provide you a container. If you wear contact lenses or glasses,                 they will be removed; please bring a case for them. 10. If you  have a Living Will and Durable Power of  for Healthcare, please bring in a copy.            11. Please bring picture ID,  insurance card, paperwork from the doctors office    (H & P, Consent, & card for implantable devices). 12. You MUST make arrangements for a responsible adult to take you home after your surgery and be able to check on you every couple                  hours for the day. You will not be allowed to leave alone or drive yourself home. It is strongly suggested someone stay with you the first 24                  hrs. Your surgery will be cancelled if you do not have a ride home. 13. Wear a mask covering your nose & mouth when entering the hospital.     Please call [phone] with any questions.

## 2022-05-17 NOTE — ANESTHESIA PRE PROCEDURE
Department of Anesthesiology  Preprocedure Note       Name:  Susan Mixon   Age:  61 y.o.  :  1955                                          MRN:  [id number]         Date:  2022      Surgeon: Isma Hess):  Clementina Raymond MD    Procedure: Procedure(s):  RIGHT EYE CATARACT EMULSIFICATION IOL IMPLANT    Medications prior to admission:   Prior to Admission medications    Medication Sig Start Date End Date Taking? Authorizing Provider   aspirin 81 MG EC tablet Take 1 tablet by mouth daily 20   Cam Kapoor MD   LUMIGAN 0.01 % SOLN ophthalmic drops Place 1 drop into both eyes nightly 20   Historical Provider, MD   valsartan (DIOVAN) 80 MG tablet TAKE 1 TABLET BY MOUTH EVERY DAY  Patient taking differently: Take 80 mg by mouth daily  4/15/20   Zack Cara Jehovah's witness, DO   rosuvastatin (CRESTOR) 20 MG tablet TAKE 1 TABLET BY MOUTH EVERY DAY  Patient taking differently: Take 20 mg by mouth daily  4/15/20   Zack Wonglim, DO   esomeprazole (NEXIUM) 20 MG capsule Take 20 mg by mouth every morning (before breakfast). OTC    Historical Provider, MD   EPINEPHrine (EPIPEN 2-BRUNA) 0.3 MG/0.3ML KIERAN injection Inject 0.3 mLs into the muscle once as needed for 1 dose. 12   Katt Ortez MD       Current medications:    No current facility-administered medications for this encounter. Current Outpatient Medications   Medication Sig Dispense Refill    aspirin 81 MG EC tablet Take 1 tablet by mouth daily 90 tablet 3    LUMIGAN 0.01 % SOLN ophthalmic drops Place 1 drop into both eyes nightly      valsartan (DIOVAN) 80 MG tablet TAKE 1 TABLET BY MOUTH EVERY DAY (Patient taking differently: Take 80 mg by mouth daily ) 90 tablet 3    rosuvastatin (CRESTOR) 20 MG tablet TAKE 1 TABLET BY MOUTH EVERY DAY (Patient taking differently: Take 20 mg by mouth daily ) 90 tablet 3    esomeprazole (NEXIUM) 20 MG capsule Take 20 mg by mouth every morning (before breakfast).  OTC      EPINEPHrine (EPIPEN 2-BRUNA) 0.3 MG/0.3ML KIERAN injection Inject 0.3 mLs into the muscle once as needed for 1 dose. 1 Device 1       Allergies: Allergies   Allergen Reactions    Peanut-Containing Drug Products Anaphylaxis     AVOIDS ALL NUTS    Zocor [Simvastatin]      Mild elevation of liver enzymes    Adhesive Tape Rash    Neosporin [Neomycin-Polymyxin B Gu] Rash       Problem List:    Patient Active Problem List   Diagnosis Code    Essential hypertension I10    Mixed hyperlipidemia E78.2    GERD (gastroesophageal reflux disease) K21.9    Atrial fibrillation (HCC) I48.91    Glaucoma H40.9    Malignant neoplasm of posterior wall of urinary bladder (HCC) C67.4    Glucose intolerance E74.39    Vitamin D deficiency E55.9    MERCEDES (obstructive sleep apnea) G47.33    Ex-smoker Z87.891    Hypersomnia G47.10    Overweight (BMI 25.0-29. 9) E66.3       Past Medical History:        Diagnosis Date    Cancer ([facility] [address]     Family history of coronary artery disease     Hyperlipidemia     Hypertension     Paroxysmal atrial fibrillation (HCC)     Screening colonoscopy 2009    few diverticula,otherwise normal; recommended 10 year follow up per Dr Elpidio Ross Sleep apnea, obstructive     CPAP       Past Surgical History:  No past surgical history on file. Social History:    Social History     Tobacco Use    Smoking status: Former Smoker     Packs/day: 0.50     Years: 5.00     Pack years: 2.50     Types: Cigarettes     Quit date: 1983     Years since quittin.4    Smokeless tobacco: Never Used    Tobacco comment: 4-pack year history of cigarette smoking   Substance Use Topics    Alcohol use: Not on file                                Counseling given: Not Answered  Comment: 4-pack year history of cigarette smoking      Vital Signs (Current): There were no vitals filed for this visit.                                            BP Readings from Last 3 Encounters:   22 (!) 142/74   20 (!) 157/86   20 138/84 NPO Status:                                                                                 BMI:   Wt Readings from Last 3 Encounters:   02/07/22 212 lb 12.8 oz (96.5 kg)   12/11/20 205 lb (93 kg)   02/11/20 204 lb 9.6 oz (92.8 kg)     There is no height or weight on file to calculate BMI.    CBC:   Lab Results   Component Value Date    WBC 7.7 12/12/2020    RBC 4.18 12/12/2020    HGB 13.4 12/12/2020    HCT 38.6 12/12/2020    MCV 92.3 12/12/2020    RDW 12.0 12/12/2020     12/12/2020       CMP:   Lab Results   Component Value Date     12/11/2020    K 3.7 12/11/2020    CL 94 12/11/2020    CO2 25 12/11/2020    BUN 11 12/11/2020    CREATININE 0.8 12/11/2020    GFRAA >60 12/11/2020    GFRAA >60 02/09/2012    AGRATIO 1.9 03/27/2019    LABGLOM >60 12/11/2020    GLUCOSE 120 12/11/2020    PROT 6.7 12/11/2020    PROT 7.0 02/09/2012    CALCIUM 8.7 12/11/2020    BILITOT 0.4 12/11/2020    ALKPHOS 74 12/11/2020    AST 28 12/11/2020    ALT 64 12/11/2020       POC Tests: No results for input(s): POCGLU, POCNA, POCK, POCCL, POCBUN, POCHEMO, POCHCT in the last 72 hours.     Coags:   Lab Results   Component Value Date    PROTIME 11.6 12/11/2020    PROTIME 10.6 11/20/2011    INR 0.96 12/11/2020       HCG (If Applicable): No results found for: PREGTESTUR, PREGSERUM, HCG, HCGQUANT     ABGs: No results found for: PHART, PO2ART, ADJ6IBQ, ZDW5LKN, BEART, A5KQZZTI     Type & Screen (If Applicable):  No results found for: LABABO, LABRH    Drug/Infectious Status (If Applicable):  No results found for: HIV, HEPCAB    COVID-19 Screening (If Applicable): No results found for: COVID19        Anesthesia Evaluation  Patient summary reviewed  Airway:         Dental:          Pulmonary:   (+) sleep apnea:                             Cardiovascular:    (+) hypertension:, dysrhythmias: atrial fibrillation,          Beta Blocker:  Not on Beta Blocker      ROS comment: Echo 2008 : LVEF 50%     Neuro/Psych:               GI/Hepatic/Renal: (+) GERD:,           Endo/Other:                     Abdominal:             Vascular: Other Findings:             Anesthesia Plan      MAC     ASA 3       Induction: intravenous.                           Zhao Oh APRN - CRNA   5/17/2022

## 2022-05-18 ENCOUNTER — HOSPITAL ENCOUNTER (OUTPATIENT)
Age: 67
Setting detail: OUTPATIENT SURGERY
Discharge: HOME | End: 2022-05-18
Attending: SPECIALIST | Admitting: SPECIALIST
Payer: COMMERCIAL

## 2022-05-18 ENCOUNTER — ANESTHESIA (OUTPATIENT)
Dept: OPERATING ROOM | Age: 67
End: 2022-05-18
Payer: COMMERCIAL

## 2022-05-18 VITALS
BODY MASS INDEX: 26.18 KG/M2 | OXYGEN SATURATION: 97 % | TEMPERATURE: 96.7 F | HEIGHT: 74 IN | WEIGHT: 204 LBS | DIASTOLIC BLOOD PRESSURE: 81 MMHG | HEART RATE: 78 BPM | SYSTOLIC BLOOD PRESSURE: 124 MMHG | RESPIRATION RATE: 16 BRPM

## 2022-05-18 PROCEDURE — 6370000000 HC RX 637 (ALT 250 FOR IP): Performed by: SPECIALIST

## 2022-05-18 PROCEDURE — 2709999900 HC NON-CHARGEABLE SUPPLY: Performed by: SPECIALIST

## 2022-05-18 PROCEDURE — 2500000003 HC RX 250 WO HCPCS: Performed by: SPECIALIST

## 2022-05-18 PROCEDURE — 6360000002 HC RX W HCPCS: Performed by: SPECIALIST

## 2022-05-18 PROCEDURE — 3600000004 HC SURGERY LEVEL 4 BASE: Performed by: SPECIALIST

## 2022-05-18 PROCEDURE — 6360000002 HC RX W HCPCS: Performed by: NURSE ANESTHETIST, CERTIFIED REGISTERED

## 2022-05-18 PROCEDURE — 7100000011 HC PHASE II RECOVERY - ADDTL 15 MIN: Performed by: SPECIALIST

## 2022-05-18 PROCEDURE — 2580000003 HC RX 258: Performed by: SPECIALIST

## 2022-05-18 PROCEDURE — 3700000000 HC ANESTHESIA ATTENDED CARE: Performed by: SPECIALIST

## 2022-05-18 PROCEDURE — 3700000001 HC ADD 15 MINUTES (ANESTHESIA): Performed by: SPECIALIST

## 2022-05-18 PROCEDURE — V2632 POST CHMBR INTRAOCULAR LENS: HCPCS | Performed by: SPECIALIST

## 2022-05-18 PROCEDURE — 3600000014 HC SURGERY LEVEL 4 ADDTL 15MIN: Performed by: SPECIALIST

## 2022-05-18 PROCEDURE — 7100000010 HC PHASE II RECOVERY - FIRST 15 MIN: Performed by: SPECIALIST

## 2022-05-18 DEVICE — ACRYSOF(R) IQ ASPHERIC NATURAL IOL, SINGLE-PIECE ACRYLIC FOLDABLE PCL, UV WITH BLUE LIGHTFILTER, 13.0MM LENGTH, 6.0MM ANTERIORASYMMETRIC BICONVEX OPTIC, PLANAR HAPTICS.
Type: IMPLANTABLE DEVICE | Site: EYE | Status: FUNCTIONAL
Brand: ACRYSOF®

## 2022-05-18 RX ORDER — MOXIFLOXACIN 5 MG/ML
SOLUTION/ DROPS OPHTHALMIC
Status: COMPLETED | OUTPATIENT
Start: 2022-05-18 | End: 2022-05-18

## 2022-05-18 RX ORDER — MOXIFLOXACIN 5 MG/ML
1 SOLUTION/ DROPS OPHTHALMIC ONCE
Status: COMPLETED | OUTPATIENT
Start: 2022-05-18 | End: 2022-05-18

## 2022-05-18 RX ORDER — PHENYLEPHRINE HYDROCHLORIDE 100 MG/ML
1 SOLUTION/ DROPS OPHTHALMIC SEE ADMIN INSTRUCTIONS
Status: DISCONTINUED | OUTPATIENT
Start: 2022-05-18 | End: 2022-05-18 | Stop reason: HOSPADM

## 2022-05-18 RX ORDER — PROPOFOL 10 MG/ML
INJECTION, EMULSION INTRAVENOUS PRN
Status: DISCONTINUED | OUTPATIENT
Start: 2022-05-18 | End: 2022-05-18 | Stop reason: SDUPTHER

## 2022-05-18 RX ORDER — AMLODIPINE BESYLATE 5 MG/1
5 TABLET ORAL DAILY
COMMUNITY

## 2022-05-18 RX ORDER — MOXIFLOXACIN 5 MG/ML
1 SOLUTION/ DROPS OPHTHALMIC SEE ADMIN INSTRUCTIONS
Status: DISCONTINUED | OUTPATIENT
Start: 2022-05-18 | End: 2022-05-18 | Stop reason: HOSPADM

## 2022-05-18 RX ORDER — TETRACAINE HYDROCHLORIDE 5 MG/ML
SOLUTION OPHTHALMIC
Status: COMPLETED | OUTPATIENT
Start: 2022-05-18 | End: 2022-05-18

## 2022-05-18 RX ORDER — LIDOCAINE HYDROCHLORIDE 20 MG/ML
INJECTION, SOLUTION INTRAVENOUS PRN
Status: DISCONTINUED | OUTPATIENT
Start: 2022-05-18 | End: 2022-05-18 | Stop reason: SDUPTHER

## 2022-05-18 RX ORDER — SODIUM CHLORIDE 0.9 % (FLUSH) 0.9 %
5-40 SYRINGE (ML) INJECTION PRN
Status: DISCONTINUED | OUTPATIENT
Start: 2022-05-18 | End: 2022-05-18 | Stop reason: HOSPADM

## 2022-05-18 RX ORDER — SODIUM CHLORIDE, SODIUM LACTATE, POTASSIUM CHLORIDE, CALCIUM CHLORIDE 600; 310; 30; 20 MG/100ML; MG/100ML; MG/100ML; MG/100ML
1000 INJECTION, SOLUTION INTRAVENOUS CONTINUOUS
Status: DISCONTINUED | OUTPATIENT
Start: 2022-05-18 | End: 2022-05-18 | Stop reason: HOSPADM

## 2022-05-18 RX ORDER — PHENYLEPHRINE HCL 2.5 %
1 DROPS OPHTHALMIC (EYE) SEE ADMIN INSTRUCTIONS
Status: DISCONTINUED | OUTPATIENT
Start: 2022-05-18 | End: 2022-05-18 | Stop reason: HOSPADM

## 2022-05-18 RX ORDER — CYCLOPENTOLATE HYDROCHLORIDE 10 MG/ML
1 SOLUTION/ DROPS OPHTHALMIC SEE ADMIN INSTRUCTIONS
Status: DISCONTINUED | OUTPATIENT
Start: 2022-05-18 | End: 2022-05-18 | Stop reason: HOSPADM

## 2022-05-18 RX ORDER — MOXIFLOXACIN 5 MG/ML
1 SOLUTION/ DROPS OPHTHALMIC ONCE
Status: DISCONTINUED | OUTPATIENT
Start: 2022-05-18 | End: 2022-05-18

## 2022-05-18 RX ADMIN — CYCLOPENTOLATE HYDROCHLORIDE 1 DROP: 10 SOLUTION/ DROPS OPHTHALMIC at 12:15

## 2022-05-18 RX ADMIN — LIDOCAINE HYDROCHLORIDE 100 MG: 20 INJECTION, SOLUTION INTRAVENOUS at 14:07

## 2022-05-18 RX ADMIN — PHENYLEPHRINE HYDROCHLORIDE 1 DROP: 100 SOLUTION/ DROPS OPHTHALMIC at 12:15

## 2022-05-18 RX ADMIN — MOXIFLOXACIN 1 DROP: 5 SOLUTION/ DROPS OPHTHALMIC at 11:10

## 2022-05-18 RX ADMIN — PHENYLEPHRINE HYDROCHLORIDE 1 DROP: 100 SOLUTION/ DROPS OPHTHALMIC at 11:10

## 2022-05-18 RX ADMIN — MOXIFLOXACIN 1 DROP: 5 SOLUTION/ DROPS OPHTHALMIC at 12:15

## 2022-05-18 RX ADMIN — PHENYLEPHRINE HYDROCHLORIDE 1 DROP: 100 SOLUTION/ DROPS OPHTHALMIC at 12:43

## 2022-05-18 RX ADMIN — PROPOFOL 70 MG: 10 INJECTION, EMULSION INTRAVENOUS at 14:07

## 2022-05-18 RX ADMIN — CYCLOPENTOLATE HYDROCHLORIDE 1 DROP: 10 SOLUTION/ DROPS OPHTHALMIC at 11:10

## 2022-05-18 RX ADMIN — PHENYLEPHRINE HYDROCHLORIDE 1 DROP: 25 SOLUTION/ DROPS OPHTHALMIC at 12:15

## 2022-05-18 RX ADMIN — PROPOFOL 10 MG: 10 INJECTION, EMULSION INTRAVENOUS at 14:10

## 2022-05-18 RX ADMIN — SODIUM CHLORIDE, POTASSIUM CHLORIDE, SODIUM LACTATE AND CALCIUM CHLORIDE 1000 ML: 600; 310; 30; 20 INJECTION, SOLUTION INTRAVENOUS at 12:21

## 2022-05-18 RX ADMIN — MOXIFLOXACIN 1 DROP: 5 SOLUTION/ DROPS OPHTHALMIC at 12:43

## 2022-05-18 RX ADMIN — PHENYLEPHRINE HYDROCHLORIDE 1 DROP: 25 SOLUTION/ DROPS OPHTHALMIC at 11:10

## 2022-05-18 ASSESSMENT — PAIN - FUNCTIONAL ASSESSMENT: PAIN_FUNCTIONAL_ASSESSMENT: 0-10

## 2022-05-18 ASSESSMENT — PAIN SCALES - GENERAL: PAINLEVEL_OUTOF10: 0

## 2022-05-18 NOTE — OP NOTE
OPERATIVE REPORT    PATIENT NAME: Debi Sweeney  SURGEON: Hayley Krishnamurthy MD M.D.    Jaime Gallo DIAGNOSIS:  Cataract OD     POSTOPERATIVE DIAGNOSIS:  Cataract OD    PROCEDURE:  Phacoemulsification of cataract with intraocular lens implant OD    ANESTHESIA:  Monitored anesthesia care with 2% Lidocaine and 0.75% Marcaine in a retrobullar injection. COMPLICATIONS:  None    INDICATIONS:  This is a patient with worsening vision over the last several months, secondary to cataract in the OD. The risks and benefits of surgery were discussed with the patient. The patient understands these risks and informed consent was obtained. DETAILS OF PROCEDURE:    In the preoperative suite, the surgical eye was marked and dilated. The patient was then brought into the operative suite and timeout done. A retrobulbar block containing lidocaine 2%, marcaine 0.75%, and vitrase was given. The patient was then prepped and draped in usual sterile ophthalmic fashion. Lid speculum placed. A paracentesis wound was made with a sideport blade. The anterior chamber was filled with Viscoat. A triplanar clear corneal incision was made at the limbus with a crescent blade and a 2.4 mm keratome. A cystotome and Utrata forceps were used to make a continuous curvilinear capsulorrhexis. Hydrodissection and hydrodelineation were performed with BSS on a cannula. Phacoemulsification was performed and the nucleus was removed. The remaining epinucleus and cortical material was removed with irrigation and aspiration. The capsular bag was filled with Provisc. The 19.5 diopter, [id number] intraocular lens was placed into the capsular bag. The lens was well-centered using a Fenzel hook. The remaining viscoelastic was removed with irrigation-aspiration. The corneal wound was hydrated and found to be water-tight. The eye was brought to normotension with BSS. Lid speculum and drape were removed.   Periocular skin was cleansed. Vigamox was placed in the eye. Eye was patched and shielded. The patient was brought to the recovery room in good condition.     Amber Mustafa M.D.

## 2022-05-18 NOTE — ANESTHESIA PRE PROCEDURE
Department of Anesthesiology  Preprocedure Note       Name:  Shikha Helm   Age:  61 y.o.  :  1955                                          MRN:  [id number]         Date:  2022      Surgeon: Deven Cosme):  Betty Bennett MD    Procedure: Procedure(s):  RIGHT EYE CATARACT EMULSIFICATION IOL IMPLANT    Medications prior to admission:   Prior to Admission medications    Medication Sig Start Date End Date Taking? Authorizing Provider   apixaban (ELIQUIS) 5 MG TABS tablet Take 5 mg by mouth 2 times daily   Yes Historical Provider, MD   amLODIPine (NORVASC) 5 MG tablet Take 5 mg by mouth daily   Yes Historical Provider, MD   aspirin 81 MG EC tablet Take 1 tablet by mouth daily 20   Wilfredo Morgan MD   LUMIGAN 0.01 % SOLN ophthalmic drops Place 1 drop into both eyes nightly 20   Historical Provider, MD   valsartan (DIOVAN) 80 MG tablet TAKE 1 TABLET BY MOUTH EVERY DAY  Patient taking differently: Take 80 mg by mouth daily  4/15/20   Zack Wadsworth DO   rosuvastatin (CRESTOR) 20 MG tablet TAKE 1 TABLET BY MOUTH EVERY DAY  Patient taking differently: Take 20 mg by mouth daily  4/15/20   Zack Wadsworth DO   esomeprazole (NEXIUM) 20 MG capsule Take 20 mg by mouth every morning (before breakfast). OTC    Historical Provider, MD   EPINEPHrine (EPIPEN 2-BRUNA) 0.3 MG/0.3ML KIERAN injection Inject 0.3 mLs into the muscle once as needed for 1 dose.  12   Oscar Ardon MD       Current medications:    Current Facility-Administered Medications   Medication Dose Route Frequency Provider Last Rate Last Admin    sodium chloride flush 0.9 % injection 5-40 mL  5-40 mL IntraVENous PRN Duc KHAN MD        lactated ringers infusion 1,000 mL  1,000 mL IntraVENous Continuous Duc KHAN  mL/hr at 22 1221 1,000 mL at 22 1221    phenylephrine (MYDFRIN) 2.5 % ophthalmic solution 1 drop  1 drop Right Eye See Admin Instructions Betty Bennett MD   1 drop at 22 1215    cyclopentolate (CYCLOGYL) 1 % ophthalmic solution 1 drop  1 drop Right Eye See Admin Instructions Sari KHAN MD   1 drop at 05/18/22 1215    phenylephrine (KULWINDER-SYNEPHRINE) 10 % ophthalmic solution 1 drop  1 drop Right Eye See Admin Instructions Sukumar Franco MD   1 drop at 05/18/22 1243    moxifloxacin (VIGAMOX) 0.5 % ophthalmic solution 1 drop  1 drop Right Eye See Admin Instructions Sukumar Franco MD   1 drop at 05/18/22 1243       Allergies: Allergies   Allergen Reactions    Peanut-Containing Drug Products Anaphylaxis     AVOIDS ALL NUTS    Zocor [Simvastatin]      Mild elevation of liver enzymes    Adhesive Tape Rash    Neosporin [Neomycin-Polymyxin B Gu] Rash       Problem List:    Patient Active Problem List   Diagnosis Code    Essential hypertension I10    Mixed hyperlipidemia E78.2    GERD (gastroesophageal reflux disease) K21.9    Atrial fibrillation (HCC) I48.91    Glaucoma H40.9    Malignant neoplasm of posterior wall of urinary bladder (HCC) C67.4    Glucose intolerance E74.39    Vitamin D deficiency E55.9    MERCEDES (obstructive sleep apnea) G47.33    Ex-smoker Z87.891    Hypersomnia G47.10    Overweight (BMI 25.0-29. 9) E66.3       Past Medical History:        Diagnosis Date    Cancer [facility])     Cerebral artery occlusion with [facility] [address] [address]     Family history of coronary artery disease     Hyperlipidemia     Hypertension     Paroxysmal atrial fibrillation ([facility] [address]     Screening colonoscopy 11/19/2009    few diverticula,otherwise normal; recommended 10 year follow up per Dr Aragon  Sleep apnea, obstructive     CPAP       Past Surgical History:        Procedure Laterality Date    ATRIAL ABLATION SURGERY      BLADDER SURGERY      removed 5 tumors    COLONOSCOPY      TONSILLECTOMY         Social History:    Social History     Tobacco Use    Smoking status: Former Smoker     Packs/day: 0.50     Years: 5.00     Pack years: 2.50     Types: Cigarettes     Quit date: 1983     Years since quittin.4    Smokeless tobacco: Never Used    Tobacco comment: 4-pack year history of cigarette smoking   Substance Use Topics    Alcohol use: Not on file     Comment: red wine daily                                 Counseling given: Not Answered  Comment: 4-pack year history of cigarette smoking      Vital Signs (Current):   Vitals:    22 1047   BP: (!) 144/77   Pulse: 80   Resp: 16   Temp: 36.2 °C (97.2 °F)   TempSrc: Temporal   SpO2: 96%   Weight: 204 lb (92.5 kg)   Height: 6' 2\" (1.88 m)                                              BP Readings from Last 3 Encounters:   22 (!) 144/77   22 (!) 142/74   20 (!) 157/86       NPO Status: Time of last liquid consumption:                         Time of last solid consumption:                         Date of last liquid consumption: 22                        Date of last solid food consumption: 22    BMI:   Wt Readings from Last 3 Encounters:   22 204 lb (92.5 kg)   22 212 lb 12.8 oz (96.5 kg)   20 205 lb (93 kg)     Body mass index is 26.19 kg/m². CBC:   Lab Results   Component Value Date    WBC 7.7 2020    RBC 4.18 2020    HGB 13.4 2020    HCT 38.6 2020    MCV 92.3 2020    RDW 12.0 2020     2020       CMP:   Lab Results   Component Value Date     2020    K 3.7 2020    CL 94 2020    CO2 25 2020    BUN 11 2020    CREATININE 0.8 2020    GFRAA >60 2020    GFRAA >60 2012    AGRATIO 1.9 2019    LABGLOM >60 2020    GLUCOSE 120 2020    PROT 6.7 2020    PROT 7.0 2012    CALCIUM 8.7 2020    BILITOT 0.4 2020    ALKPHOS 74 2020    AST 28 2020    ALT 64 2020       POC Tests: No results for input(s): POCGLU, POCNA, POCK, POCCL, POCBUN, POCHEMO, POCHCT in the last 72 hours.     Coags:   Lab Results   Component Value Date PROTIME 11.6 12/11/2020    PROTIME 10.6 11/20/2011    INR 0.96 12/11/2020       HCG (If Applicable): No results found for: PREGTESTUR, PREGSERUM, HCG, HCGQUANT     ABGs: No results found for: PHART, PO2ART, ZDM2NGJ, BUR4BXZ, BEART, B0HQRWFX     Type & Screen (If Applicable):  No results found for: LABABO, LABRH    Drug/Infectious Status (If Applicable):  No results found for: HIV, HEPCAB    COVID-19 Screening (If Applicable): No results found for: COVID19        Anesthesia Evaluation  Patient summary reviewed  Airway: Mallampati: II        Dental: normal exam         Pulmonary:normal exam    (+) sleep apnea:                             Cardiovascular:  Exercise tolerance: good (>4 METS),   (+) hypertension:, dysrhythmias: atrial fibrillation,          Beta Blocker:  Not on Beta Blocker      ROS comment: Echo 2008 : LVEF 50%     Neuro/Psych:   (+) CVA:,              ROS comment: No residual GI/Hepatic/Renal:   (+) GERD:,           Endo/Other:                     Abdominal:             Vascular: Other Findings:             Anesthesia Plan      MAC     ASA 3       Induction: intravenous. MIPS: prophylactic pharmacologic antiemetic agents not administered perioperatively for documented reasons. Anesthetic plan and risks discussed with patient. Pre Anesthesia Evaluation complete. Anesthesia plan, risks, benefits, alternatives, and personnel discussed with patient and/or legal guardian. Patient and/or legal guardian verbalized an understanding and agreed to proceed. Anesthesia plan discussed with care team members and agreed upon.   MICHELLE Flynn - CRNA  5/18/2022

## 2022-05-18 NOTE — PROGRESS NOTES
Dr. Joy Kessler in to speak with patient and daughter. He reviewed post-op instructions with both of them.

## 2022-05-18 NOTE — PROGRESS NOTES
Patient remains A/O x4. VS stable. Dressing to right eye remains D/I. Patient drank ice water without c/o nausea or vomiting. Patient ready for discharge home. Verbal and written discharge instructions reviewed with patient and daughter. They signed and received copies after voicing understanding to all. Patient instructed to call MD's office with any questions that arise. Patient voices understanding. Daughter is driving patient home.

## 2022-05-18 NOTE — PROGRESS NOTES
Patient returned to room from [place]. Bed brakes applied and VS obtained. See flow sheet. Patient A/O x4. Drink and snack offered. Dressing to right eye dry/intact with no drainage. Dauggter at bedside. Will continue to monitor. Call light in reach.

## 2022-05-18 NOTE — ANESTHESIA POSTPROCEDURE EVALUATION
Department of Anesthesiology  Postprocedure Note    Patient: Elli Power  MRN: [id number]  YOB: 1955  Date of evaluation: 5/18/2022  Time:  3:02 PM     Procedure Summary     Date: 05/18/22 Room / Location: [address] [place] [address] 01 [facility]    Anesthesia Start: 0171 Anesthesia Stop: 1934    Procedure: RIGHT EYE CATARACT EMULSIFICATION IOL IMPLANT (Right Eye) Diagnosis:       Nuclear sclerotic cataract of right eye      Cataract, post subcapsular polar senile, right      (Nuclear sclerotic cataract of right eye [H25.11] Cataract, post subcapsular polar senile, right [H25.041])    Surgeons: Danny Osullivan MD Responsible Provider: MICHELLE Villalba CRNA    Anesthesia Type: MAC ASA Status: 3          Anesthesia Type: No value filed. Marilu Phase I:  10    Marilu Phase II:  10    Last vitals: Reviewed and per EMR flowsheets.        Anesthesia Post Evaluation    Patient location during evaluation: bedside  Patient participation: complete - patient participated  Level of consciousness: awake and alert  Pain score: 0  Airway patency: patent  Nausea & Vomiting: no nausea and no vomiting  Complications: no  Cardiovascular status: hemodynamically stable  Respiratory status: room air and spontaneous ventilation  Hydration status: stable

## 2023-02-07 ENCOUNTER — OFFICE VISIT (OUTPATIENT)
Dept: PULMONOLOGY | Age: 68
End: 2023-02-07
Payer: MEDICARE

## 2023-02-07 VITALS
BODY MASS INDEX: 27.98 KG/M2 | OXYGEN SATURATION: 96 % | SYSTOLIC BLOOD PRESSURE: 126 MMHG | RESPIRATION RATE: 16 BRPM | WEIGHT: 218 LBS | HEART RATE: 51 BPM | HEIGHT: 74 IN | DIASTOLIC BLOOD PRESSURE: 78 MMHG

## 2023-02-07 DIAGNOSIS — Z87.891 EX-SMOKER: ICD-10-CM

## 2023-02-07 DIAGNOSIS — E66.3 OVERWEIGHT (BMI 25.0-29.9): ICD-10-CM

## 2023-02-07 DIAGNOSIS — G47.10 HYPERSOMNIA: ICD-10-CM

## 2023-02-07 DIAGNOSIS — G47.33 OSA (OBSTRUCTIVE SLEEP APNEA): ICD-10-CM

## 2023-02-07 PROCEDURE — 99214 OFFICE O/P EST MOD 30 MIN: CPT | Performed by: INTERNAL MEDICINE

## 2023-02-07 PROCEDURE — 3074F SYST BP LT 130 MM HG: CPT | Performed by: INTERNAL MEDICINE

## 2023-02-07 PROCEDURE — 3078F DIAST BP <80 MM HG: CPT | Performed by: INTERNAL MEDICINE

## 2023-02-07 PROCEDURE — G8427 DOCREV CUR MEDS BY ELIG CLIN: HCPCS | Performed by: INTERNAL MEDICINE

## 2023-02-07 PROCEDURE — G8419 CALC BMI OUT NRM PARAM NOF/U: HCPCS | Performed by: INTERNAL MEDICINE

## 2023-02-07 PROCEDURE — G8484 FLU IMMUNIZE NO ADMIN: HCPCS | Performed by: INTERNAL MEDICINE

## 2023-02-07 RX ORDER — BRIMONIDINE TARTRATE 2 MG/ML
SOLUTION/ DROPS OPHTHALMIC
COMMUNITY
Start: 2022-11-28

## 2023-02-07 ASSESSMENT — ENCOUNTER SYMPTOMS
BACK PAIN: 0
EYE ITCHING: 0
COUGH: 0
EYE DISCHARGE: 0
ABDOMINAL PAIN: 0
ABDOMINAL DISTENTION: 0
SHORTNESS OF BREATH: 0

## 2023-02-07 NOTE — PROGRESS NOTES
Russell Whitfield  1955  Referring Provider: No PCP    Subjective:     Chief Complaint   Patient presents with    1 Year Follow Up    Sleep Apnea       HPI  Toño Alonso is a 74 y.o. male has come back as a follow up. He has mild MERCEDES with EDS. He is on the CPAP of 8 cm h20. He has a nasal mask. He is using it every night about 6 to 7.5 hours. He says that it is helping him. He has few lb weight gain. His 2 week download data showed a residual ia 1.5 and leak is 6.9 L/min. Current Outpatient Medications   Medication Sig Dispense Refill    apixaban (ELIQUIS) 5 MG TABS tablet Take 5 mg by mouth 2 times daily      amLODIPine (NORVASC) 5 MG tablet Take 5 mg by mouth daily      aspirin 81 MG EC tablet Take 1 tablet by mouth daily 90 tablet 3    LUMIGAN 0.01 % SOLN ophthalmic drops Place 1 drop into both eyes nightly      valsartan (DIOVAN) 80 MG tablet TAKE 1 TABLET BY MOUTH EVERY DAY (Patient taking differently: Take 80 mg by mouth daily) 90 tablet 3    rosuvastatin (CRESTOR) 20 MG tablet TAKE 1 TABLET BY MOUTH EVERY DAY (Patient taking differently: Take 20 mg by mouth daily) 90 tablet 3    esomeprazole (NEXIUM) 20 MG delayed release capsule Take 20 mg by mouth every morning (before breakfast). OTC      EPINEPHrine (EPIPEN 2-BRUNA) 0.3 MG/0.3ML KIERAN injection Inject 0.3 mLs into the muscle once as needed for 1 dose. 1 Device 1    brimonidine (ALPHAGAN) 0.2 % ophthalmic solution INSTILL 1 DROP IN LEFT EYE TWICE DAILY       No current facility-administered medications for this visit.        Allergies   Allergen Reactions    Peanut-Containing Drug Products Anaphylaxis     AVOIDS ALL NUTS    Zocor [Simvastatin]      Mild elevation of liver enzymes    Adhesive Tape Rash    Neosporin [Neomycin-Polymyxin B Gu] Rash       Past Medical History:   Diagnosis Date    Cancer ([facility] [address]     Cerebral artery occlusion with cerebral infarction [facility])     Family history of coronary artery disease     Hyperlipidemia     Hypertension     Paroxysmal atrial fibrillation ([facility] [address]     Screening colonoscopy 2009    few diverticula,otherwise normal; recommended 10 year follow up per Dr Amador Tinajero    Sleep apnea, obstructive     CPAP       Past Surgical History:   Procedure Laterality Date    ATRIAL ABLATION SURGERY      BLADDER SURGERY      removed 5 tumors    COLONOSCOPY      INTRACAPSULAR CATARACT EXTRACTION Right 2022    RIGHT EYE CATARACT EMULSIFICATION IOL IMPLANT performed by Carlyn Nova MD at [address] [facility] History    Marital status:      Spouse name: None    Number of children: None    Years of education: None    Highest education level: None   Tobacco Use    Smoking status: Former     Packs/day: 0.50     Years: 5.00     Pack years: 2.50     Types: Cigarettes     Quit date: 1983     Years since quittin.1    Smokeless tobacco: Never    Tobacco comments:     4-pack year history of cigarette smoking   Vaping Use    Vaping Use: Never used   Substance and Sexual Activity    Drug use: Never       Review of Systems   Constitutional:  Negative for fatigue. HENT:  Negative for congestion and postnasal drip. Eyes:  Negative for discharge and itching. Respiratory:  Negative for cough and shortness of breath. Cardiovascular:  Negative for chest pain and leg swelling. Gastrointestinal:  Negative for abdominal distention and abdominal pain. Endocrine: Negative for cold intolerance and heat intolerance. Genitourinary:  Negative for enuresis and frequency. Musculoskeletal:  Negative for arthralgias and back pain. Allergic/Immunologic: Negative for environmental allergies and food allergies. Neurological:  Negative for light-headedness and headaches. Hematological:  Negative for adenopathy. Psychiatric/Behavioral:  Negative for agitation and behavioral problems.       Objective:   /78   Pulse 51   Resp 16   Ht 6' 2\" (1.88 m)   Wt 218 lb (98.9 kg)   SpO2 96%   BMI 27.99 kg/m²   Body mass index is 27.99 kg/m². Sleep Medicine 11/14/2019   Sitting and reading 1   Watching TV 1   Sitting, inactive in a public place (e.g. a theatre or a meeting) 0   As a passenger in a car for an hour without a break 1   Lying down to rest in the afternoon when circumstances permit 0   Sitting and talking to someone 0   Sitting quietly after a lunch without alcohol 0   In a car, while stopped for a few minutes in traffic 0   [place] Sleepiness Score 3   Neck circumference (Inches) 16.5     Mallampati 3    Physical Exam  Vitals reviewed. Constitutional:       Appearance: Normal appearance. HENT:      Head: Normocephalic and atraumatic. Nose: Nose normal.      Mouth/Throat:      Mouth: Mucous membranes are moist.   Eyes:      Extraocular Movements: Extraocular movements intact. Pupils: Pupils are equal, round, and reactive to light. Cardiovascular:      Rate and Rhythm: Normal rate and regular rhythm. Pulses: Normal pulses. Heart sounds: Normal heart sounds. Pulmonary:      Effort: Pulmonary effort is normal.      Breath sounds: Normal breath sounds. Abdominal:      General: Abdomen is flat. Palpations: Abdomen is soft. Musculoskeletal:         General: Normal range of motion. Cervical back: Normal range of motion and neck supple. Skin:     General: Skin is warm and dry. Neurological:      General: No focal deficit present. Mental Status: He is alert and oriented to person, place, and time. Psychiatric:         Mood and Affect: Mood normal.         Behavior: Behavior normal.       Radiology: None    Assessment and Plan     Problem List          Respiratory    MERCEDES (obstructive sleep apnea)      Advised to be compliant with the CPAP  Loose weight            Other    Ex-smoker      Loose weight         Hypersomnia      Advised to be compliant with the CPAP  Loose weight           Overweight (BMI 25.0-29. 9)      Loose weight Follow-Up:    Return in about 1 year (around 2/7/2024) for 2 week download data.      Progress notes sent to the referring Provider    Lamont Freed MD MD  2/7/2023  9:48 AM

## 2024-02-09 ENCOUNTER — OFFICE VISIT (OUTPATIENT)
Dept: PULMONOLOGY | Age: 69
End: 2024-02-09
Payer: MEDICARE

## 2024-02-09 VITALS
RESPIRATION RATE: 16 BRPM | SYSTOLIC BLOOD PRESSURE: 124 MMHG | HEART RATE: 71 BPM | HEIGHT: 74 IN | OXYGEN SATURATION: 97 % | BODY MASS INDEX: 28.11 KG/M2 | WEIGHT: 219 LBS | DIASTOLIC BLOOD PRESSURE: 66 MMHG

## 2024-02-09 DIAGNOSIS — G47.33 OSA (OBSTRUCTIVE SLEEP APNEA): Primary | ICD-10-CM

## 2024-02-09 PROCEDURE — G8427 DOCREV CUR MEDS BY ELIG CLIN: HCPCS | Performed by: NURSE PRACTITIONER

## 2024-02-09 PROCEDURE — 3017F COLORECTAL CA SCREEN DOC REV: CPT | Performed by: NURSE PRACTITIONER

## 2024-02-09 PROCEDURE — G8484 FLU IMMUNIZE NO ADMIN: HCPCS | Performed by: NURSE PRACTITIONER

## 2024-02-09 PROCEDURE — 3078F DIAST BP <80 MM HG: CPT | Performed by: NURSE PRACTITIONER

## 2024-02-09 PROCEDURE — 99213 OFFICE O/P EST LOW 20 MIN: CPT | Performed by: NURSE PRACTITIONER

## 2024-02-09 PROCEDURE — 1123F ACP DISCUSS/DSCN MKR DOCD: CPT | Performed by: NURSE PRACTITIONER

## 2024-02-09 PROCEDURE — 1036F TOBACCO NON-USER: CPT | Performed by: NURSE PRACTITIONER

## 2024-02-09 PROCEDURE — G8419 CALC BMI OUT NRM PARAM NOF/U: HCPCS | Performed by: NURSE PRACTITIONER

## 2024-02-09 PROCEDURE — 3074F SYST BP LT 130 MM HG: CPT | Performed by: NURSE PRACTITIONER

## 2024-02-09 NOTE — PROGRESS NOTES
Surendra Roberson (:  1955) is a 68 y.o. male,Established patient, here for evaluation of the following chief complaint(s):  1 Year Follow Up and Sleep Apnea (Compliance check)        Subjective   SUBJECTIVE/OBJECTIVE:  Surendra is a 67 yo male patient of Dr. JEFFERSON Paris. He has returned to clinic for CPAP compliance check on CPAP with pressure of 8 cmH2O. He denies having any problems with  the nasal mask and use of the device. He reports energy levels are good.  He is complaint using the device nightly 90 out of 90 days (100%) for 8 hours and 30 minutes on an AirSense 10 AutoSet. Residual AHI is 0.9 with a median leak of 3.9 l/min.       Review of Systems   Psychiatric/Behavioral:  Positive for sleep disturbance.    All other systems reviewed and are negative.         Objective   Physical Exam  Vitals and nursing note reviewed.   Constitutional:       General: He is awake.      Appearance: Normal appearance. He is well-developed and well-groomed.   Eyes:      Extraocular Movements: Extraocular movements intact.      Conjunctiva/sclera: Conjunctivae normal.      Pupils: Pupils are equal, round, and reactive to light.   Cardiovascular:      Rate and Rhythm: Normal rate and regular rhythm.      Pulses: Normal pulses.      Heart sounds: Normal heart sounds.   Pulmonary:      Effort: Pulmonary effort is normal.      Breath sounds: Normal breath sounds.   Musculoskeletal:         General: Normal range of motion.      Cervical back: Normal range of motion and neck supple.   Skin:     General: Skin is warm and dry.      Capillary Refill: Capillary refill takes less than 2 seconds.   Neurological:      General: No focal deficit present.      Mental Status: He is alert.   Psychiatric:         Mood and Affect: Mood normal.         Behavior: Behavior normal. Behavior is cooperative.         Thought Content: Thought content normal.         Judgment: Judgment normal.           ASSESSMENT/PLAN:  1. MERCEDES (obstructive sleep

## 2024-06-01 LAB
ESTIMATED AVERAGE GLUCOSE: NORMAL
HBA1C MFR BLD: 6 %

## 2025-01-16 ENCOUNTER — SCHEDULED TELEPHONE ENCOUNTER (OUTPATIENT)
Dept: PULMONOLOGY | Age: 70
End: 2025-01-16

## 2025-01-16 DIAGNOSIS — G47.33 OSA (OBSTRUCTIVE SLEEP APNEA): Primary | ICD-10-CM

## 2025-01-16 RX ORDER — HYDROQUINONE 40 MG/G
1 CREAM TOPICAL 2 TIMES DAILY
COMMUNITY
Start: 2024-11-07

## 2025-01-16 RX ORDER — CARVEDILOL 12.5 MG/1
12.5 TABLET ORAL 2 TIMES DAILY
COMMUNITY
Start: 2024-04-26 | End: 2025-02-16

## 2025-01-16 NOTE — PROGRESS NOTES
Total Time: minutes: 11-20 minutes     Surendra Roberson was evaluated through a synchronous (real-time) audio encounter. Patient identification was verified at the start of the visit. He (or guardian if applicable) is aware that this is a billable service, which includes applicable co-pays. This visit was conducted with the patient's (and/or legal guardian's) verbal consent. He has not had a related appointment within my department in the past 7 days or scheduled within the next 24 hours.   The patient was located at Home: [address] [id number].  The provider was located [facility] [address] [address]  [place] [address].    Note: not billable if this call serves to triage the patient into an appointment for the relevant concern  Yes, I confirm.   Surendra Roberson is a 69 y.o. male evaluated via telephone on 1/16/2025 for Follow-up (1yr compliance )  .    Assessment & Plan  MERCEDES (obstructive sleep apnea)  Patient meets compliance with use of CPAP device on a set pressure of 8 cmH2O.    Advised to continue compliance with device.        No follow-ups on file.      Subjective     uSrendra Roberson is participating in a telephone visit to review his CPAP compliance report. Status of the report shows that he meets compliance. He used his device 30/30 days (100%) for 8 hours and 40 minutes on the AirSense 10 AutoSet with a set pressure of 8 cmH2O. Residual AHI is 1.1 events per hour. A leakage is 62 l/min. He continues receiving good clinical benefit from use. He has had no issues getting supplies or trouble with the device.       Review of Systems   Psychiatric/Behavioral:  Positive for sleep disturbance.         Wear CPAP nightly  on set press of 8 cmH2O.    All other systems reviewed and are negative.        Objective   Patient-Reported Vitals  No data recorded       MICHELLE Quezada - KELLEE

## 2025-01-16 NOTE — ASSESSMENT & PLAN NOTE
Patient meets compliance with use of CPAP device on a set pressure of 8 cmH2O.    Advised to continue compliance with device.

## 2025-02-25 ENCOUNTER — COMMUNITY OUTREACH (OUTPATIENT)
Dept: FAMILY MEDICINE CLINIC | Age: 70
End: 2025-02-25

## 2025-02-25 NOTE — PROGRESS NOTES
Patient's  shows they are overdue for Hemoglobin A1C  Care Everywhere and  files searched.   updated with 6/1/24 A1C result.

## 2025-03-10 ENCOUNTER — HOSPITAL ENCOUNTER (EMERGENCY)
Age: 70
Discharge: HOME | End: 2025-03-10
Payer: MEDICARE

## 2025-03-10 VITALS
OXYGEN SATURATION: 98 % | SYSTOLIC BLOOD PRESSURE: 153 MMHG | HEART RATE: 78 BPM | TEMPERATURE: 97.9 F | RESPIRATION RATE: 16 BRPM | DIASTOLIC BLOOD PRESSURE: 79 MMHG

## 2025-03-10 DIAGNOSIS — R04.0 EPISTAXIS: Primary | ICD-10-CM

## 2025-03-10 LAB
BASOPHILS # BLD: 0.07 K/UL
BASOPHILS NFR BLD: 1 % (ref 0–1)
EOSINOPHIL # BLD: 0.27 K/UL
EOSINOPHILS RELATIVE PERCENT: 3 % (ref 0–3)
ERYTHROCYTE [DISTWIDTH] IN BLOOD BY AUTOMATED COUNT: 12 % (ref 11.7–14.9)
HCT VFR BLD AUTO: 42.2 % (ref 42–52)
HGB BLD-MCNC: 14.4 G/DL (ref 13.5–18)
IMM GRANULOCYTES # BLD AUTO: 0.06 K/UL
IMM GRANULOCYTES NFR BLD: 1 %
INR PPP: 1
LYMPHOCYTES NFR BLD: 2.68 K/UL
LYMPHOCYTES RELATIVE PERCENT: 33 % (ref 24–44)
MCH RBC QN AUTO: 31.2 PG (ref 27–31)
MCHC RBC AUTO-ENTMCNC: 34.1 G/DL (ref 32–36)
MCV RBC AUTO: 91.5 FL (ref 78–100)
MONOCYTES NFR BLD: 1.03 K/UL
MONOCYTES NFR BLD: 13 % (ref 0–4)
NEUTROPHILS NFR BLD: 50 % (ref 36–66)
NEUTS SEG NFR BLD: 4.1 K/UL
PLATELET # BLD AUTO: 190 K/UL (ref 140–440)
PMV BLD AUTO: 9.3 FL (ref 7.5–11.1)
PROTHROMBIN TIME: 13.3 SEC (ref 11.7–14.5)
RBC # BLD AUTO: 4.61 M/UL (ref 4.6–6.2)
WBC OTHER # BLD: 8.2 K/UL (ref 4–10.5)

## 2025-03-10 PROCEDURE — 6370000000 HC RX 637 (ALT 250 FOR IP): Performed by: PHYSICIAN ASSISTANT

## 2025-03-10 PROCEDURE — 85025 COMPLETE CBC W/AUTO DIFF WBC: CPT

## 2025-03-10 PROCEDURE — 99283 EMERGENCY DEPT VISIT LOW MDM: CPT

## 2025-03-10 PROCEDURE — 85610 PROTHROMBIN TIME: CPT

## 2025-03-10 RX ORDER — VALSARTAN 160 MG/1
320 TABLET ORAL ONCE
Status: COMPLETED | OUTPATIENT
Start: 2025-03-10 | End: 2025-03-10

## 2025-03-10 RX ORDER — AMLODIPINE BESYLATE 5 MG/1
5 TABLET ORAL ONCE
Status: COMPLETED | OUTPATIENT
Start: 2025-03-10 | End: 2025-03-10

## 2025-03-10 RX ORDER — CARVEDILOL 6.25 MG/1
12.5 TABLET ORAL ONCE
Status: COMPLETED | OUTPATIENT
Start: 2025-03-10 | End: 2025-03-10

## 2025-03-10 RX ORDER — CARVEDILOL 6.25 MG/1
12.5 TABLET ORAL 2 TIMES DAILY WITH MEALS
Status: DISCONTINUED | OUTPATIENT
Start: 2025-03-10 | End: 2025-03-10

## 2025-03-10 RX ORDER — OXYMETAZOLINE HYDROCHLORIDE 0.05 G/100ML
2 SPRAY NASAL ONCE
Status: COMPLETED | OUTPATIENT
Start: 2025-03-10 | End: 2025-03-10

## 2025-03-10 RX ADMIN — OXYMETAZOLINE HYDROCHLORIDE 2 SPRAY: 0.5 SPRAY NASAL at 12:59

## 2025-03-10 RX ADMIN — AMLODIPINE BESYLATE 5 MG: 5 TABLET ORAL at 12:59

## 2025-03-10 RX ADMIN — VALSARTAN 320 MG: 160 TABLET ORAL at 13:52

## 2025-03-10 RX ADMIN — CARVEDILOL 12.5 MG: 6.25 TABLET, FILM COATED ORAL at 13:52

## 2025-03-10 ASSESSMENT — PAIN - FUNCTIONAL ASSESSMENT: PAIN_FUNCTIONAL_ASSESSMENT: NONE - DENIES PAIN

## 2025-03-10 NOTE — ED PROVIDER NOTES
[facility] EMERGENCY DEPARTMENT  EMERGENCY DEPARTMENT ENCOUNTER        Pt Name: Surendra Roberson  MRN: [id number]  Birthdate 1955  Date of evaluation: 3/10/2025  Provider: Kendall Raza PA-C  PCP: Chary Washington PA-C  Note Started: 12:50 PM EDT 3/10/25      HUSSAIN. I have evaluated this patient.        CHIEF COMPLAINT       Chief Complaint   Patient presents with    Epistaxis     Pt has been having nose bleeds every morning X3 days. Pt reports the current nose bleed has lasted more than 1 hour. Pt used generic afrin with no relief. Pt on baby aspirin but no other blood thinners         HISTORY OF PRESENT ILLNESS: 1 or more Elements     History From: patient    Limitations to history : None    Social Determinants Significantly Affecting Health : None    Chief Complaint: Nosebleeds    Surendra Roberson is a 69 y.o. male who presents complaining of nosebleeds for the last 3 days.  Patient reports he sleeps with a CPAP and every morning he wakes up in the hydration canister is empty and his nose feels dry.  For the last 3 days he has had bleeding in the morning.  Today it did not stop with direct pressure or Afrin so he came to the ER.  His daughter placed a tampon in his nose which has helped the bleeding some.  He felt the blood going down his throat earlier this morning, states he has not felt it recently.  He has been off Eliquis for 2 years.  Takes a baby aspirin daily.  He did not take any of his 3 hypertension meds this morning because he was having the nasal bleeding.  Denies any trauma, no surgery, dizziness, syncope.    Nursing Notes were all reviewed and agreed with or any disagreements were addressed in the HPI.    REVIEW OF SYSTEMS :      Review of Systems   All other systems reviewed and are negative.      Positives and Pertinent negatives as per HPI.     SURGICAL HISTORY     Past Surgical History:   Procedure Laterality Date    ATRIAL ABLATION SURGERY      BLADDER SURGERY

## 2025-03-29 SDOH — ECONOMIC STABILITY: INCOME INSECURITY: IN THE LAST 12 MONTHS, WAS THERE A TIME WHEN YOU WERE NOT ABLE TO PAY THE MORTGAGE OR RENT ON TIME?: NO

## 2025-03-29 SDOH — ECONOMIC STABILITY: FOOD INSECURITY: WITHIN THE PAST 12 MONTHS, THE FOOD YOU BOUGHT JUST DIDN'T LAST AND YOU DIDN'T HAVE MONEY TO GET MORE.: NEVER TRUE

## 2025-03-29 SDOH — ECONOMIC STABILITY: FOOD INSECURITY: WITHIN THE PAST 12 MONTHS, YOU WORRIED THAT YOUR FOOD WOULD RUN OUT BEFORE YOU GOT MONEY TO BUY MORE.: NEVER TRUE

## 2025-03-29 SDOH — ECONOMIC STABILITY: TRANSPORTATION INSECURITY
IN THE PAST 12 MONTHS, HAS THE LACK OF TRANSPORTATION KEPT YOU FROM MEDICAL APPOINTMENTS OR FROM GETTING MEDICATIONS?: NO

## 2025-03-29 SDOH — ECONOMIC STABILITY: TRANSPORTATION INSECURITY
IN THE PAST 12 MONTHS, HAS LACK OF TRANSPORTATION KEPT YOU FROM MEETINGS, WORK, OR FROM GETTING THINGS NEEDED FOR DAILY LIVING?: NO

## 2025-03-29 ASSESSMENT — PATIENT HEALTH QUESTIONNAIRE - PHQ9
1. LITTLE INTEREST OR PLEASURE IN DOING THINGS: NOT AT ALL
SUM OF ALL RESPONSES TO PHQ9 QUESTIONS 1 & 2: 0
SUM OF ALL RESPONSES TO PHQ QUESTIONS 1-9: 0
1. LITTLE INTEREST OR PLEASURE IN DOING THINGS: NOT AT ALL
2. FEELING DOWN, DEPRESSED OR HOPELESS: NOT AT ALL
SUM OF ALL RESPONSES TO PHQ QUESTIONS 1-9: 0
2. FEELING DOWN, DEPRESSED OR HOPELESS: NOT AT ALL
SUM OF ALL RESPONSES TO PHQ QUESTIONS 1-9: 0
SUM OF ALL RESPONSES TO PHQ QUESTIONS 1-9: 0

## 2025-04-01 ENCOUNTER — OFFICE VISIT (OUTPATIENT)
Dept: FAMILY MEDICINE CLINIC | Age: 70
End: 2025-04-01
Payer: MEDICARE

## 2025-04-01 VITALS
WEIGHT: 220 LBS | BODY MASS INDEX: 28.23 KG/M2 | HEART RATE: 67 BPM | OXYGEN SATURATION: 98 % | SYSTOLIC BLOOD PRESSURE: 138 MMHG | DIASTOLIC BLOOD PRESSURE: 76 MMHG | HEIGHT: 74 IN

## 2025-04-01 DIAGNOSIS — H25.89 OTHER AGE-RELATED CATARACT, UNSPECIFIED LATERALITY: ICD-10-CM

## 2025-04-01 DIAGNOSIS — Z76.89 ENCOUNTER TO ESTABLISH CARE: ICD-10-CM

## 2025-04-01 DIAGNOSIS — Z91.018 TREE NUT ALLERGY: ICD-10-CM

## 2025-04-01 DIAGNOSIS — Z86.73 HISTORY OF STROKE: ICD-10-CM

## 2025-04-01 DIAGNOSIS — R73.03 PREDIABETES: ICD-10-CM

## 2025-04-01 DIAGNOSIS — K21.9 GASTROESOPHAGEAL REFLUX DISEASE, UNSPECIFIED WHETHER ESOPHAGITIS PRESENT: ICD-10-CM

## 2025-04-01 DIAGNOSIS — E78.2 MIXED HYPERLIPIDEMIA: ICD-10-CM

## 2025-04-01 DIAGNOSIS — E66.3 OVERWEIGHT (BMI 25.0-29.9): ICD-10-CM

## 2025-04-01 DIAGNOSIS — Z85.51 HISTORY OF BLADDER CANCER: ICD-10-CM

## 2025-04-01 DIAGNOSIS — I48.91 ATRIAL FIBRILLATION, UNSPECIFIED TYPE (HCC): ICD-10-CM

## 2025-04-01 DIAGNOSIS — G47.33 OSA (OBSTRUCTIVE SLEEP APNEA): ICD-10-CM

## 2025-04-01 DIAGNOSIS — Z87.891 EX-SMOKER: ICD-10-CM

## 2025-04-01 DIAGNOSIS — Z23 NEED FOR TDAP VACCINATION: ICD-10-CM

## 2025-04-01 DIAGNOSIS — I10 ESSENTIAL HYPERTENSION: Primary | ICD-10-CM

## 2025-04-01 PROBLEM — I63.432 CEREBROVASCULAR ACCIDENT (CVA) DUE TO EMBOLISM OF LEFT POSTERIOR CEREBRAL ARTERY (HCC): Status: RESOLVED | Noted: 2020-12-28 | Resolved: 2025-04-01

## 2025-04-01 PROBLEM — I63.432 CEREBROVASCULAR ACCIDENT (CVA) DUE TO EMBOLISM OF LEFT POSTERIOR CEREBRAL ARTERY (HCC): Status: ACTIVE | Noted: 2020-12-28

## 2025-04-01 PROCEDURE — G8427 DOCREV CUR MEDS BY ELIG CLIN: HCPCS

## 2025-04-01 PROCEDURE — G8419 CALC BMI OUT NRM PARAM NOF/U: HCPCS

## 2025-04-01 PROCEDURE — 1123F ACP DISCUSS/DSCN MKR DOCD: CPT

## 2025-04-01 PROCEDURE — 3078F DIAST BP <80 MM HG: CPT

## 2025-04-01 PROCEDURE — 1036F TOBACCO NON-USER: CPT

## 2025-04-01 PROCEDURE — 1159F MED LIST DOCD IN RCRD: CPT

## 2025-04-01 PROCEDURE — 99204 OFFICE O/P NEW MOD 45 MIN: CPT

## 2025-04-01 PROCEDURE — 3075F SYST BP GE 130 - 139MM HG: CPT

## 2025-04-01 PROCEDURE — 3017F COLORECTAL CA SCREEN DOC REV: CPT

## 2025-04-01 PROCEDURE — 1160F RVW MEDS BY RX/DR IN RCRD: CPT

## 2025-04-01 RX ORDER — EPINEPHRINE 0.3 MG/.3ML
0.3 INJECTION SUBCUTANEOUS AS NEEDED
Qty: 0.3 ML | Refills: 2 | Status: SHIPPED | OUTPATIENT
Start: 2025-04-01

## 2025-04-01 RX ORDER — MOXIFLOXACIN 5 MG/ML
SOLUTION/ DROPS OPHTHALMIC
COMMUNITY
Start: 2025-03-27 | End: 2025-04-01 | Stop reason: ALTCHOICE

## 2025-04-01 RX ORDER — PREDNISOLONE ACETATE 10 MG/ML
SUSPENSION/ DROPS OPHTHALMIC
COMMUNITY
Start: 2025-03-27

## 2025-04-01 ASSESSMENT — ENCOUNTER SYMPTOMS
SHORTNESS OF BREATH: 0
ABDOMINAL PAIN: 0

## 2025-04-01 NOTE — ASSESSMENT & PLAN NOTE
History of bladder cancer SP BCG, in remission.  Follows with [facility] annually.  Most recent PET scan normal.  Follow-up oncology

## 2025-04-01 NOTE — ASSESSMENT & PLAN NOTE
Managed by cardiology. BP stable.  Continue valsartan, amlodipine, carvedilol.  Chronic, at goal (stable), continue current treatment plan    Medicare wellness in 6 months

## 2025-04-01 NOTE — PROGRESS NOTES
Chary Washington PA-C  [phone]    Surendra Roberson  [id number]  69 y.o.  1955    Chief Complaint:  Chief Complaint   Patient presents with    New Patient       HPI:  Surendra is a 69 y.o. male, former patient of Dr. Jose Alberto Petty, who presents today for evaluation and management of HTN, HLD, hx bladder CA and CVA and to establish care.    HTN and HLD - managed by cardiology, Dr Adorno    Colonoscopy - request recent results from Dr Bar    MERCEDES - CPAP compliant    Hx bladder CA s/p bcg. [facility] annually. Most recent PET normal    Upcoming cataract removal w/ Dr Linda    Assessment and Medical Decision Makin. Essential hypertension  Assessment & Plan:  Managed by cardiology. BP stable.  Continue valsartan, amlodipine, carvedilol.  Check labs.  Chronic, at goal (stable), continue current treatment plan    Medicare wellness in 6 months  Orders:  -     Comprehensive Metabolic Panel; Future  -     LIPID PANEL; Future  2. Mixed hyperlipidemia  Assessment & Plan:  Check CMP and lipids.  Continue statin. Chronic, at goal (stable),   Orders:  -     Comprehensive Metabolic Panel; Future  -     LIPID PANEL; Future  3. Atrial fibrillation, unspecified type (HCC)  Assessment & Plan:  No longer on Eliquis.  On ASA.  Follow-up cardiology. Chronic, at goal (stable), continue current treatment plan  4. Prediabetes  Assessment & Plan:  Work on diet and exercise.  Check CMP and A1c   Orders:  -     Comprehensive Metabolic Panel; Future  -     LIPID PANEL; Future  -     Hemoglobin A1C; Future  5. History of bladder cancer  Assessment & Plan:  History of bladder cancer SP BCG, in remission.  Follows with [facility] annually who also monitors PSA.  Most recent PET scan normal.  Follow-up oncology   6. History of stroke  Assessment & Plan:  Unknown etiology.  No residual effects.  Has undergone cardiac workup including implantable cardiac monitor. Saw stroke specialist in [place]. No longer on Eliquis. On ASA,

## 2025-04-01 NOTE — PATIENT INSTRUCTIONS
Please go to your local pharmacy or health department and have your tetanus shot (TDAP) updated.  You are currently overdue.  Please have a copy of these records sent to our office.     You have orders for outpatient testing, (AAA screen).  The scheduling department should contact you to help arrange this appointment at a facility near you at your convenience.  If you have not heard from them within one week, we recommend that you call them directly at [phone] and ask for someone who can help schedule your testing.

## 2025-04-01 NOTE — ASSESSMENT & PLAN NOTE
Unknown etiology.  No residual effects.  Has undergone cardiac workup including implantable cardiac monitor. Saw stroke specialist in [place]. No longer on Eliquis. On ASA, will continue

## 2025-04-01 NOTE — ASSESSMENT & PLAN NOTE
No longer on Eliquis.  On ASA.  Follow-up cardiology. Chronic, at goal (stable), continue current treatment plan

## 2025-04-21 ENCOUNTER — HOSPITAL ENCOUNTER (OUTPATIENT)
Dept: ULTRASOUND IMAGING | Age: 70
Discharge: HOME | End: 2025-04-21
Payer: MEDICARE

## 2025-04-21 DIAGNOSIS — Z87.891 EX-SMOKER: ICD-10-CM

## 2025-04-21 PROCEDURE — 76706 US ABDL AORTA SCREEN AAA: CPT

## 2025-04-23 ENCOUNTER — RESULTS FOLLOW-UP (OUTPATIENT)
Dept: FAMILY MEDICINE CLINIC | Age: 70
End: 2025-04-23

## (undated) DEVICE — MICROSURGICAL INSTRUMENT RETROBULAR NEEDLE 25GA X 38MM (1 1/2 IN): Brand: ALCON

## (undated) DEVICE — CLEARCUT® HP2 SLIT KNIFE INTREPID MICRO-COAXIAL SYSTEM 2.4 DB: Brand: CLEARCUT®; INTREPID

## (undated) DEVICE — GLOVE ORANGE PI 7   MSG9070

## (undated) DEVICE — SOLUTION IRRIG 250ML STRL H2O PLAS POUR BTL USP

## (undated) DEVICE — SET ADMIN L105IN 10GTT 3 NDL FREE CK VLV 2 PC M LUERLOCK

## (undated) DEVICE — CLEARCUT® SIDEPORT KNIFE DUAL BEVEL 1.0MM ANGLED: Brand: CLEARCUT®

## (undated) DEVICE — CYTOTOME IRRIG 25GA L16MM ANT CAP BENT

## (undated) DEVICE — GLOVE SURG SZ 65 THK91MIL LTX FREE SYN POLYISOPRENE

## (undated) DEVICE — ELECTRODE,RADIOTRANSLUCENT,FOAM,5PK: Brand: MEDLINE

## (undated) DEVICE — INTREPID® TRANSFORMER IA HP: Brand: INTREPID®

## (undated) DEVICE — SET EXTN L41IN 2 NDL FREE VALVES FREE INJ PRT

## (undated) DEVICE — POSITIONER,HEAD,RING CUSHION,9IN,32CS: Brand: MEDLINE

## (undated) DEVICE — SATINCRESCENT® KNIFE ANGLED BEVEL UP: Brand: SATINCRESCENT®

## (undated) DEVICE — LINE SAMP O2 6.5FT W/FEMALE CONN F/ADULT CAPNOLINE PLUS

## (undated) DEVICE — Device

## (undated) DEVICE — HYPODERMIC SAFETY NEEDLE: Brand: MAGELLAN